# Patient Record
Sex: FEMALE | Race: OTHER | Employment: OTHER | URBAN - METROPOLITAN AREA
[De-identification: names, ages, dates, MRNs, and addresses within clinical notes are randomized per-mention and may not be internally consistent; named-entity substitution may affect disease eponyms.]

---

## 2017-01-09 ENCOUNTER — APPOINTMENT (OUTPATIENT)
Dept: CT IMAGING | Age: 72
End: 2017-01-09
Attending: EMERGENCY MEDICINE
Payer: MEDICARE

## 2017-01-09 ENCOUNTER — HOSPITAL ENCOUNTER (EMERGENCY)
Age: 72
Discharge: HOME | End: 2017-01-09
Attending: EMERGENCY MEDICINE
Payer: MEDICARE

## 2017-01-09 VITALS
HEIGHT: 64 IN | SYSTOLIC BLOOD PRESSURE: 131 MMHG | BODY MASS INDEX: 24.41 KG/M2 | WEIGHT: 143 LBS | RESPIRATION RATE: 18 BRPM | HEART RATE: 59 BPM | DIASTOLIC BLOOD PRESSURE: 71 MMHG | TEMPERATURE: 98 F | OXYGEN SATURATION: 98 %

## 2017-01-09 DIAGNOSIS — N20.0 KIDNEY STONE: Primary | ICD-10-CM

## 2017-01-09 DIAGNOSIS — K59.00 CONSTIPATION, UNSPECIFIED CONSTIPATION TYPE: ICD-10-CM

## 2017-01-09 LAB
ALBUMIN SERPL-MCNC: 3.6 G/DL (ref 3.5–4.8)
ALP LIVER SERPL-CCNC: 63 U/L (ref 55–142)
ALT SERPL-CCNC: 27 U/L (ref 14–54)
AST SERPL-CCNC: 24 U/L (ref 15–41)
BASOPHILS # BLD AUTO: 0.04 X10(3) UL (ref 0–0.1)
BASOPHILS NFR BLD AUTO: 0.5 %
BILIRUB SERPL-MCNC: 0.3 MG/DL (ref 0.1–2)
BILIRUB UR QL STRIP.AUTO: NEGATIVE
BUN BLD-MCNC: 19 MG/DL (ref 8–20)
CALCIUM BLD-MCNC: 8.4 MG/DL (ref 8.3–10.3)
CHLORIDE: 104 MMOL/L (ref 101–111)
CLARITY UR REFRACT.AUTO: CLEAR
CO2: 28 MMOL/L (ref 22–32)
COLOR UR AUTO: YELLOW
CREAT BLD-MCNC: 0.85 MG/DL (ref 0.55–1.02)
EOSINOPHIL # BLD AUTO: 0.19 X10(3) UL (ref 0–0.3)
EOSINOPHIL NFR BLD AUTO: 2.5 %
ERYTHROCYTE [DISTWIDTH] IN BLOOD BY AUTOMATED COUNT: 13.5 % (ref 11.5–16)
GLUCOSE BLD-MCNC: 116 MG/DL (ref 70–99)
GLUCOSE UR STRIP.AUTO-MCNC: NEGATIVE MG/DL
HCT VFR BLD AUTO: 39.3 % (ref 34–50)
HGB BLD-MCNC: 12.6 G/DL (ref 12–16)
IMMATURE GRANULOCYTE COUNT: 0.02 X10(3) UL (ref 0–1)
IMMATURE GRANULOCYTE RATIO %: 0.3 %
KETONES UR STRIP.AUTO-MCNC: NEGATIVE MG/DL
LEUKOCYTE ESTERASE UR QL STRIP.AUTO: NEGATIVE
LIPASE: 217 U/L (ref 73–393)
LYMPHOCYTES # BLD AUTO: 2.14 X10(3) UL (ref 0.9–4)
LYMPHOCYTES NFR BLD AUTO: 28.1 %
M PROTEIN MFR SERPL ELPH: 6.9 G/DL (ref 6.1–8.3)
MCH RBC QN AUTO: 29.5 PG (ref 27–33.2)
MCHC RBC AUTO-ENTMCNC: 32.1 G/DL (ref 31–37)
MCV RBC AUTO: 92 FL (ref 81–100)
MONOCYTES # BLD AUTO: 0.63 X10(3) UL (ref 0.1–0.6)
MONOCYTES NFR BLD AUTO: 8.3 %
NEUTROPHIL ABS PRELIM: 4.59 X10 (3) UL (ref 1.3–6.7)
NEUTROPHILS # BLD AUTO: 4.59 X10(3) UL (ref 1.3–6.7)
NEUTROPHILS NFR BLD AUTO: 60.3 %
NITRITE UR QL STRIP.AUTO: NEGATIVE
PH UR STRIP.AUTO: 6 [PH] (ref 4.5–8)
PLATELET # BLD AUTO: 227 10(3)UL (ref 150–450)
POTASSIUM SERPL-SCNC: 3.8 MMOL/L (ref 3.6–5.1)
PROT UR STRIP.AUTO-MCNC: NEGATIVE MG/DL
RBC # BLD AUTO: 4.27 X10(6)UL (ref 3.8–5.1)
RED CELL DISTRIBUTION WIDTH-SD: 45.6 FL (ref 35.1–46.3)
SODIUM SERPL-SCNC: 139 MMOL/L (ref 136–144)
SP GR UR STRIP.AUTO: 1.01 (ref 1–1.03)
UROBILINOGEN UR STRIP.AUTO-MCNC: 0.2 MG/DL
WBC # BLD AUTO: 7.6 X10(3) UL (ref 4–13)

## 2017-01-09 PROCEDURE — 85025 COMPLETE CBC W/AUTO DIFF WBC: CPT | Performed by: EMERGENCY MEDICINE

## 2017-01-09 PROCEDURE — 83690 ASSAY OF LIPASE: CPT | Performed by: EMERGENCY MEDICINE

## 2017-01-09 PROCEDURE — 96374 THER/PROPH/DIAG INJ IV PUSH: CPT

## 2017-01-09 PROCEDURE — 99284 EMERGENCY DEPT VISIT MOD MDM: CPT

## 2017-01-09 PROCEDURE — 80053 COMPREHEN METABOLIC PANEL: CPT | Performed by: EMERGENCY MEDICINE

## 2017-01-09 PROCEDURE — 81001 URINALYSIS AUTO W/SCOPE: CPT | Performed by: EMERGENCY MEDICINE

## 2017-01-09 PROCEDURE — 74176 CT ABD & PELVIS W/O CONTRAST: CPT

## 2017-01-09 RX ORDER — NAPROXEN 500 MG/1
500 TABLET ORAL 2 TIMES DAILY PRN
Qty: 20 TABLET | Refills: 0 | Status: SHIPPED | OUTPATIENT
Start: 2017-01-09 | End: 2017-04-12 | Stop reason: ALTCHOICE

## 2017-01-09 RX ORDER — KETOROLAC TROMETHAMINE 30 MG/ML
15 INJECTION, SOLUTION INTRAMUSCULAR; INTRAVENOUS ONCE
Status: COMPLETED | OUTPATIENT
Start: 2017-01-09 | End: 2017-01-09

## 2017-01-09 RX ORDER — IBUPROFEN 600 MG/1
600 TABLET ORAL ONCE
Status: DISCONTINUED | OUTPATIENT
Start: 2017-01-09 | End: 2017-01-09

## 2017-01-09 RX ORDER — DOCUSATE SODIUM 100 MG/1
100 CAPSULE, LIQUID FILLED ORAL 2 TIMES DAILY PRN
Qty: 20 CAPSULE | Refills: 0 | Status: SHIPPED | OUTPATIENT
Start: 2017-01-09 | End: 2017-05-22 | Stop reason: ALTCHOICE

## 2017-01-09 RX ORDER — TRAMADOL HYDROCHLORIDE 50 MG/1
TABLET ORAL EVERY 4 HOURS PRN
Qty: 20 TABLET | Refills: 0 | Status: SHIPPED | OUTPATIENT
Start: 2017-01-09 | End: 2017-01-16

## 2017-01-10 ENCOUNTER — TELEPHONE (OUTPATIENT)
Dept: FAMILY MEDICINE CLINIC | Facility: CLINIC | Age: 72
End: 2017-01-10

## 2017-01-10 NOTE — ED INITIAL ASSESSMENT (HPI)
Pt to ed co right flank pain onset around noon today describes pain as a pulling pain denies nausea denies urinary sxs

## 2017-01-10 NOTE — ED PROVIDER NOTES
Patient Seen in: THE [facility] Emergency Department In [place]    History   Patient presents with:  Abdomen/Flank Pain (GI/)    Stated Complaint: RIGHT FLANK PAIN    HPI    75-year-old female presents to the emergency department with complaints of right fl are negative. Positive for stated complaint: RIGHT FLANK PAIN  Other systems are as noted in HPI. Constitutional and vital signs reviewed. All other systems reviewed and negative except as noted above.     PSFH elements reviewed from today and ag COMP METABOLIC PANEL (14) - Abnormal; Notable for the following:     Glucose 116 (*)     All other components within normal limits   URINALYSIS WITH CULTURE REFLEX - Abnormal; Notable for the following:     Blood Urine Large (*)     All other components [address]  [phone]    Schedule an appointment as soon as possible for a visit      Rafael Hernandez MD  [facility] [address] [place] [address]  [phone]    Schedule an appointment as soon as possible for a visit        Medications Prescribed:  Anamika Arreguin

## 2017-01-10 NOTE — TELEPHONE ENCOUNTER
Patient was seen in the ER on 1/9/2017 for right flank pain. Pt thinks she at to much meat the night before and that is why she was having the pain. Patient states she was told in the ER all she has is inflammation.  Patient states her  was going to

## 2017-01-14 ENCOUNTER — OFFICE VISIT (OUTPATIENT)
Dept: FAMILY MEDICINE CLINIC | Facility: CLINIC | Age: 72
End: 2017-01-14

## 2017-01-14 VITALS
BODY MASS INDEX: 24.64 KG/M2 | TEMPERATURE: 98 F | SYSTOLIC BLOOD PRESSURE: 126 MMHG | HEART RATE: 57 BPM | HEIGHT: 63.25 IN | RESPIRATION RATE: 16 BRPM | WEIGHT: 140.81 LBS | OXYGEN SATURATION: 97 % | DIASTOLIC BLOOD PRESSURE: 64 MMHG

## 2017-01-14 DIAGNOSIS — R31.9 HEMATURIA: ICD-10-CM

## 2017-01-14 DIAGNOSIS — N20.0 RIGHT KIDNEY STONE: Primary | ICD-10-CM

## 2017-01-14 PROCEDURE — 99213 OFFICE O/P EST LOW 20 MIN: CPT | Performed by: FAMILY MEDICINE

## 2017-01-14 NOTE — PROGRESS NOTES
Grace Hayes is a 66year old female. S:  Patient presents today with the following concerns:  · Right sided abdominal pain continues. Noticing polyuria and blood in urine. Had CT scan in ER 1/9/17 and diagnosed with right kidney stone.   H on exertion  GI: see above  No N/V/D/C  : denies dysuria  MUSCULOSKELETAL: denies back pain  NEURO: denies headaches    EXAM:  /64 mmHg  Pulse 57  Temp(Src) 98 °F (36.7 °C) (Oral)  Resp 16  Ht 63.25\"  Wt 140 lb 12.8 oz  BMI 24.73 kg/m2  SpO2 97%

## 2017-02-10 ENCOUNTER — OFFICE VISIT (OUTPATIENT)
Dept: FAMILY MEDICINE CLINIC | Facility: CLINIC | Age: 72
End: 2017-02-10

## 2017-02-10 VITALS
BODY MASS INDEX: 25.34 KG/M2 | DIASTOLIC BLOOD PRESSURE: 70 MMHG | RESPIRATION RATE: 16 BRPM | WEIGHT: 143 LBS | TEMPERATURE: 98 F | HEIGHT: 63 IN | HEART RATE: 72 BPM | SYSTOLIC BLOOD PRESSURE: 136 MMHG

## 2017-02-10 DIAGNOSIS — R31.9 BLOOD IN URINE: Primary | ICD-10-CM

## 2017-02-10 LAB
GLUCOSE (URINE DIPSTICK): NEGATIVE MG/DL
LEUKOCYTES: NEGATIVE
MULTISTIX LOT#: ABNORMAL NUMERIC
NITRITE, URINE: NEGATIVE
PH, URINE: 6 (ref 4.5–8)
SPECIFIC GRAVITY: 1.02 (ref 1–1.03)
URINE-COLOR: YELLOW

## 2017-02-10 PROCEDURE — 81003 URINALYSIS AUTO W/O SCOPE: CPT | Performed by: FAMILY MEDICINE

## 2017-02-10 PROCEDURE — 99213 OFFICE O/P EST LOW 20 MIN: CPT | Performed by: FAMILY MEDICINE

## 2017-02-10 NOTE — PROGRESS NOTES
Brendan Hernández is a 66year old female. HPI:   Patient presents for follow-up on hematuria. Patient was seen in the emergency room 1/9/2017 and diagnosed with a right-sided kidney stone. She then saw Dr. Krissy King, 1/20/2017.   No stone was seen o

## 2017-02-24 ENCOUNTER — TELEPHONE (OUTPATIENT)
Dept: FAMILY MEDICINE CLINIC | Facility: CLINIC | Age: 72
End: 2017-02-24

## 2017-02-24 NOTE — TELEPHONE ENCOUNTER
Received incoming fax from [address] [place] [address] Patient is scheduled for Cystoscopy procedure on 03- with Dr. Krissy King and needs History and Physical Fax to [phone] BMP and EKG orders in the computer.   Appointment scheduled with Dr. Emmy Zaragoza for 02/27

## 2017-02-27 ENCOUNTER — OFFICE VISIT (OUTPATIENT)
Dept: FAMILY MEDICINE CLINIC | Facility: CLINIC | Age: 72
End: 2017-02-27

## 2017-02-27 VITALS
RESPIRATION RATE: 16 BRPM | HEIGHT: 62.8 IN | TEMPERATURE: 98 F | HEART RATE: 72 BPM | BODY MASS INDEX: 25.16 KG/M2 | DIASTOLIC BLOOD PRESSURE: 60 MMHG | WEIGHT: 142 LBS | SYSTOLIC BLOOD PRESSURE: 98 MMHG

## 2017-02-27 DIAGNOSIS — Z01.818 PREOP EXAMINATION: Primary | ICD-10-CM

## 2017-02-27 DIAGNOSIS — N20.1 URETERAL STONE: ICD-10-CM

## 2017-02-27 DIAGNOSIS — R31.0 GROSS HEMATURIA: ICD-10-CM

## 2017-02-27 DIAGNOSIS — I34.1 MVP (MITRAL VALVE PROLAPSE): ICD-10-CM

## 2017-02-27 DIAGNOSIS — F41.9 ANXIETY: ICD-10-CM

## 2017-02-27 PROCEDURE — 99214 OFFICE O/P EST MOD 30 MIN: CPT | Performed by: FAMILY MEDICINE

## 2017-02-27 NOTE — PROGRESS NOTES
Daya Wu is a 66year old female who presents for a pre-operative physical exam. Patient is to have cystoscopy, to be done by Dr. Teena Shanks on 3/6/17. Pt has had previous anesthesia:  Yes.   Previous complications:  No  Hx of DVT/PE:  no    HPI developed, well nourished,in no apparent distress  HEENT: atraumatic, normocephalic, O/P clear  NECK: supple,no adenopathy  LUNGS: clear to auscultation  CARDIO: RRR without murmur  GI: good BS's,no masses, HSM or tenderness  EXTREMITIES: no edema  NEURO:

## 2017-03-02 ENCOUNTER — APPOINTMENT (OUTPATIENT)
Dept: LAB | Age: 72
End: 2017-03-02
Attending: FAMILY MEDICINE
Payer: MEDICARE

## 2017-03-02 ENCOUNTER — APPOINTMENT (OUTPATIENT)
Dept: LAB | Age: 72
End: 2017-03-02
Attending: UROLOGY
Payer: MEDICARE

## 2017-03-02 ENCOUNTER — PRIOR ORIGINAL RECORDS (OUTPATIENT)
Dept: OTHER | Age: 72
End: 2017-03-02

## 2017-03-02 DIAGNOSIS — Z01.818 ENCOUNTER FOR PRE-OPERATIVE EXAMINATION: ICD-10-CM

## 2017-03-02 DIAGNOSIS — Z01.818 PREOP EXAMINATION: ICD-10-CM

## 2017-03-02 DIAGNOSIS — N20.1 RIGHT URETERAL STONE: ICD-10-CM

## 2017-03-02 DIAGNOSIS — R31.0 GROSS HEMATURIA: ICD-10-CM

## 2017-03-02 LAB
ATRIAL RATE: 61 BPM
BUN BLD-MCNC: 23 MG/DL (ref 8–20)
CALCIUM BLD-MCNC: 9.3 MG/DL (ref 8.3–10.3)
CHLORIDE: 104 MMOL/L (ref 101–111)
CO2: 29 MMOL/L (ref 22–32)
CREAT BLD-MCNC: 0.87 MG/DL (ref 0.55–1.02)
GLUCOSE BLD-MCNC: 96 MG/DL (ref 70–99)
P AXIS: 62 DEGREES
P-R INTERVAL: 232 MS
POTASSIUM SERPL-SCNC: 3.9 MMOL/L (ref 3.6–5.1)
Q-T INTERVAL: 422 MS
QRS DURATION: 88 MS
QTC CALCULATION (BEZET): 424 MS
R AXIS: 13 DEGREES
SODIUM SERPL-SCNC: 140 MMOL/L (ref 136–144)
T AXIS: 48 DEGREES
VENTRICULAR RATE: 61 BPM

## 2017-03-02 PROCEDURE — 36415 COLL VENOUS BLD VENIPUNCTURE: CPT

## 2017-03-02 PROCEDURE — 80048 BASIC METABOLIC PNL TOTAL CA: CPT

## 2017-03-02 PROCEDURE — 93010 ELECTROCARDIOGRAM REPORT: CPT | Performed by: INTERNAL MEDICINE

## 2017-03-02 PROCEDURE — 93005 ELECTROCARDIOGRAM TRACING: CPT

## 2017-03-06 ENCOUNTER — APPOINTMENT (OUTPATIENT)
Dept: GENERAL RADIOLOGY | Age: 72
End: 2017-03-06
Attending: EMERGENCY MEDICINE
Payer: MEDICARE

## 2017-03-06 ENCOUNTER — PRIOR ORIGINAL RECORDS (OUTPATIENT)
Dept: OTHER | Age: 72
End: 2017-03-06

## 2017-03-06 ENCOUNTER — HOSPITAL ENCOUNTER (EMERGENCY)
Age: 72
Discharge: HOME | End: 2017-03-06
Attending: EMERGENCY MEDICINE
Payer: MEDICARE

## 2017-03-06 ENCOUNTER — OFFICE VISIT (OUTPATIENT)
Dept: FAMILY MEDICINE CLINIC | Facility: CLINIC | Age: 72
End: 2017-03-06

## 2017-03-06 VITALS
HEART RATE: 62 BPM | RESPIRATION RATE: 16 BRPM | BODY MASS INDEX: 22.82 KG/M2 | TEMPERATURE: 99 F | DIASTOLIC BLOOD PRESSURE: 60 MMHG | OXYGEN SATURATION: 99 % | SYSTOLIC BLOOD PRESSURE: 140 MMHG | HEIGHT: 66 IN | WEIGHT: 142 LBS

## 2017-03-06 VITALS
HEART RATE: 60 BPM | SYSTOLIC BLOOD PRESSURE: 96 MMHG | TEMPERATURE: 98 F | BODY MASS INDEX: 25.27 KG/M2 | WEIGHT: 142.63 LBS | RESPIRATION RATE: 16 BRPM | DIASTOLIC BLOOD PRESSURE: 40 MMHG | HEIGHT: 63 IN

## 2017-03-06 DIAGNOSIS — I95.9 HYPOTENSION, UNSPECIFIED HYPOTENSION TYPE: ICD-10-CM

## 2017-03-06 DIAGNOSIS — I49.3 PVC'S (PREMATURE VENTRICULAR CONTRACTIONS): ICD-10-CM

## 2017-03-06 DIAGNOSIS — R00.1 BRADYCARDIA: Primary | ICD-10-CM

## 2017-03-06 LAB
ALBUMIN SERPL-MCNC: 4.1 G/DL (ref 3.5–4.8)
ALP LIVER SERPL-CCNC: 58 U/L (ref 55–142)
ALT SERPL-CCNC: 23 U/L (ref 14–54)
AST SERPL-CCNC: 26 U/L (ref 15–41)
ATRIAL RATE: 67 BPM
BASOPHILS # BLD AUTO: 0.04 X10(3) UL (ref 0–0.1)
BASOPHILS NFR BLD AUTO: 0.6 %
BILIRUB SERPL-MCNC: 0.6 MG/DL (ref 0.1–2)
BUN BLD-MCNC: 17 MG/DL (ref 8–20)
CALCIUM BLD-MCNC: 9.1 MG/DL (ref 8.3–10.3)
CHLORIDE: 106 MMOL/L (ref 101–111)
CO2: 31 MMOL/L (ref 22–32)
CREAT BLD-MCNC: 0.84 MG/DL (ref 0.55–1.02)
EOSINOPHIL # BLD AUTO: 0.11 X10(3) UL (ref 0–0.3)
EOSINOPHIL NFR BLD AUTO: 1.7 %
ERYTHROCYTE [DISTWIDTH] IN BLOOD BY AUTOMATED COUNT: 13.4 % (ref 11.5–16)
GLUCOSE BLD-MCNC: 100 MG/DL (ref 70–99)
HCT VFR BLD AUTO: 43.1 % (ref 34–50)
HGB BLD-MCNC: 13.8 G/DL (ref 12–16)
IMMATURE GRANULOCYTE COUNT: 0.01 X10(3) UL (ref 0–1)
IMMATURE GRANULOCYTE RATIO %: 0.2 %
LYMPHOCYTES # BLD AUTO: 1.83 X10(3) UL (ref 0.9–4)
LYMPHOCYTES NFR BLD AUTO: 28.2 %
M PROTEIN MFR SERPL ELPH: 7.4 G/DL (ref 6.1–8.3)
MCH RBC QN AUTO: 29.9 PG (ref 27–33.2)
MCHC RBC AUTO-ENTMCNC: 32 G/DL (ref 31–37)
MCV RBC AUTO: 93.5 FL (ref 81–100)
MONOCYTES # BLD AUTO: 0.56 X10(3) UL (ref 0.1–0.6)
MONOCYTES NFR BLD AUTO: 8.6 %
NEUTROPHIL ABS PRELIM: 3.93 X10 (3) UL (ref 1.3–6.7)
NEUTROPHILS # BLD AUTO: 3.93 X10(3) UL (ref 1.3–6.7)
NEUTROPHILS NFR BLD AUTO: 60.7 %
P AXIS: 42 DEGREES
P-R INTERVAL: 214 MS
PLATELET # BLD AUTO: 232 10(3)UL (ref 150–450)
POTASSIUM SERPL-SCNC: 4 MMOL/L (ref 3.6–5.1)
Q-T INTERVAL: 438 MS
QRS DURATION: 84 MS
QTC CALCULATION (BEZET): 462 MS
R AXIS: -10 DEGREES
RBC # BLD AUTO: 4.61 X10(6)UL (ref 3.8–5.1)
RED CELL DISTRIBUTION WIDTH-SD: 46 FL (ref 35.1–46.3)
SODIUM SERPL-SCNC: 141 MMOL/L (ref 136–144)
T AXIS: 1 DEGREES
TROPONIN: <0.046 NG/ML (ref ?–0.05)
VENTRICULAR RATE: 67 BPM
WBC # BLD AUTO: 6.5 X10(3) UL (ref 4–13)

## 2017-03-06 PROCEDURE — 99285 EMERGENCY DEPT VISIT HI MDM: CPT

## 2017-03-06 PROCEDURE — 71010 XR CHEST AP PORTABLE  (CPT=71010): CPT

## 2017-03-06 PROCEDURE — 85025 COMPLETE CBC W/AUTO DIFF WBC: CPT | Performed by: EMERGENCY MEDICINE

## 2017-03-06 PROCEDURE — 93010 ELECTROCARDIOGRAM REPORT: CPT

## 2017-03-06 PROCEDURE — 99213 OFFICE O/P EST LOW 20 MIN: CPT | Performed by: FAMILY MEDICINE

## 2017-03-06 PROCEDURE — 84484 ASSAY OF TROPONIN QUANT: CPT | Performed by: EMERGENCY MEDICINE

## 2017-03-06 PROCEDURE — 80053 COMPREHEN METABOLIC PANEL: CPT | Performed by: EMERGENCY MEDICINE

## 2017-03-06 PROCEDURE — 96374 THER/PROPH/DIAG INJ IV PUSH: CPT

## 2017-03-06 PROCEDURE — 93005 ELECTROCARDIOGRAM TRACING: CPT

## 2017-03-06 PROCEDURE — 99284 EMERGENCY DEPT VISIT MOD MDM: CPT

## 2017-03-06 NOTE — ED PROVIDER NOTES
Patient Seen in: [facility] Emergency Department In [place]    History   Patient presents with:  Hypotension (cardiovascular)    Stated Complaint: WAS [address].     HPI    49-year-old female presents emergency department negative except as noted above. PSFH elements reviewed from today and agreed except as otherwise stated in HPI.     Physical Exam     ED Triage Vitals   BP 03/06/17 1125 142/66 mmHg   Pulse 03/06/17 1125 66   Resp 03/06/17 1125 18   Temp 03/06/17 1125 98 EKG    Rate, intervals and axes as noted on EKG Report.   Rate: 60  Rhythm: Sinus Rhythm  Reading: Frequent PVCs first-degree block          I told the patient if she is feeling symptomatic she should probably cut back her Coreg but I would like her to chec

## 2017-03-06 NOTE — PROGRESS NOTES
Chief Complaint:  Patient presents with:  Blood Pressure: Was to have kidney stone on R side removed. Went to have surgery and her BP was very low. She was sent to [facility] and she was told she was fine. They sent her to her PCP.       HPI:  This is a 66 ye Take 1 tablet (500 mg total) by mouth 2 (two) times daily as needed. Disp: 20 tablet Rfl: 0   Omega-3-acid Ethyl Esters 1 G Oral Cap Take 1 g by mouth daily. Disp:  Rfl:    vitamin E 400 UNITS Oral Cap Take 1,000 Units by mouth.  Disp:  Rfl:    Sertraline H and nutrients (protein, fruits and water). Will recheck in 2 weeks. IF improved, will plan to clear for procedure.       Johanne Fletcher DO  3/6/2017 2:25 PM  Family Medicine

## 2017-03-06 NOTE — ED INITIAL ASSESSMENT (HPI)
Was at [facility], for kidney stone removal, per MD at center, pt was bradycardic. Pt denies any symptoms.

## 2017-03-20 ENCOUNTER — OFFICE VISIT (OUTPATIENT)
Dept: FAMILY MEDICINE CLINIC | Facility: CLINIC | Age: 72
End: 2017-03-20

## 2017-03-20 VITALS
WEIGHT: 143 LBS | DIASTOLIC BLOOD PRESSURE: 70 MMHG | RESPIRATION RATE: 16 BRPM | TEMPERATURE: 98 F | SYSTOLIC BLOOD PRESSURE: 120 MMHG | BODY MASS INDEX: 25.34 KG/M2 | HEART RATE: 72 BPM | HEIGHT: 63 IN

## 2017-03-20 DIAGNOSIS — E78.5 HYPERLIPIDEMIA, UNSPECIFIED HYPERLIPIDEMIA TYPE: ICD-10-CM

## 2017-03-20 DIAGNOSIS — Z01.818 PREOP EXAMINATION: Primary | ICD-10-CM

## 2017-03-20 DIAGNOSIS — I34.1 MVP (MITRAL VALVE PROLAPSE): ICD-10-CM

## 2017-03-20 DIAGNOSIS — I49.3 PVC (PREMATURE VENTRICULAR CONTRACTION): ICD-10-CM

## 2017-03-20 DIAGNOSIS — F41.9 ANXIETY: ICD-10-CM

## 2017-03-20 DIAGNOSIS — N20.1 URETERAL STONE: ICD-10-CM

## 2017-03-20 PROBLEM — E78.00 PURE HYPERCHOLESTEROLEMIA: Status: ACTIVE | Noted: 2017-03-20

## 2017-03-20 PROCEDURE — 99214 OFFICE O/P EST MOD 30 MIN: CPT | Performed by: FAMILY MEDICINE

## 2017-03-20 NOTE — PROGRESS NOTES
Sindy Wolfe is a 66year old female who presents for a pre-operative physical exam. Patient is to have cystoscopy, to be done by Dr. Angelita Rivera.  Pt has had previous anesthesia:  Yes.   Previous complications:  No  Hx of DVT/PE:  no    HPI:   Patient carvedilol 3.125 MG Oral Tab 3.125 mg.    Disp:  Rfl:       Allergies: No Known Allergies   Past Medical History   Diagnosis Date   • Other and unspecified hyperlipidemia    • Arthritis    • Osteoporosis           Past Surgical History    TOTAL ABDOM HYST

## 2017-04-12 ENCOUNTER — OFFICE VISIT (OUTPATIENT)
Dept: FAMILY MEDICINE CLINIC | Facility: CLINIC | Age: 72
End: 2017-04-12

## 2017-04-12 VITALS
DIASTOLIC BLOOD PRESSURE: 70 MMHG | TEMPERATURE: 100 F | OXYGEN SATURATION: 96 % | RESPIRATION RATE: 12 BRPM | SYSTOLIC BLOOD PRESSURE: 128 MMHG | HEART RATE: 73 BPM

## 2017-04-12 DIAGNOSIS — R68.89 FLU-LIKE SYMPTOMS: Primary | ICD-10-CM

## 2017-04-12 DIAGNOSIS — R50.9 FEVER, UNSPECIFIED FEVER CAUSE: ICD-10-CM

## 2017-04-12 DIAGNOSIS — R52 BODY ACHES: ICD-10-CM

## 2017-04-12 DIAGNOSIS — R05.9 COUGH: ICD-10-CM

## 2017-04-12 PROCEDURE — 99213 OFFICE O/P EST LOW 20 MIN: CPT | Performed by: PHYSICIAN ASSISTANT

## 2017-04-12 RX ORDER — AZITHROMYCIN 250 MG/1
TABLET, FILM COATED ORAL
Qty: 6 TABLET | Refills: 0 | Status: SHIPPED | OUTPATIENT
Start: 2017-04-12 | End: 2017-04-21 | Stop reason: ALTCHOICE

## 2017-04-12 NOTE — PROGRESS NOTES
CC:  Patient presents with:  Cough: Dry cough, fever, body aches, some congestion. HPI: Jeanine Russ presents with complaints of body aches, a low grade fever, and a mild cough. Symptoms have been present for 1 day. She has taken Dayquil this morning.  Her Sinus: No TTP over frontal/ethmoid sinuses; no TTP over maxillary sinuses; no facial swelling   Neck: Normal ROM; no deformities or tenderness to palpation; no thyromegaly   Lymph:  No cervical/supraclavicular adenopathy   Cardiovascular: RRR; no murmurs azithromycin (ZITHROMAX Z-HAN) 250 MG Oral Tab 6 tablet 0      Sig: Take two tablets by mouth today, then one tablet daily.

## 2017-04-17 ENCOUNTER — HOSPITAL (OUTPATIENT)
Dept: OTHER | Age: 72
End: 2017-04-17
Attending: UROLOGY

## 2017-04-21 ENCOUNTER — OFFICE VISIT (OUTPATIENT)
Dept: FAMILY MEDICINE CLINIC | Facility: CLINIC | Age: 72
End: 2017-04-21

## 2017-04-21 VITALS
DIASTOLIC BLOOD PRESSURE: 60 MMHG | WEIGHT: 139 LBS | RESPIRATION RATE: 16 BRPM | HEART RATE: 72 BPM | TEMPERATURE: 98 F | SYSTOLIC BLOOD PRESSURE: 120 MMHG | BODY MASS INDEX: 25 KG/M2

## 2017-04-21 DIAGNOSIS — J01.40 ACUTE NON-RECURRENT PANSINUSITIS: Primary | ICD-10-CM

## 2017-04-21 PROCEDURE — 99213 OFFICE O/P EST LOW 20 MIN: CPT | Performed by: FAMILY MEDICINE

## 2017-04-21 RX ORDER — AMOXICILLIN 875 MG/1
875 TABLET, COATED ORAL 2 TIMES DAILY
Qty: 20 TABLET | Refills: 0 | Status: SHIPPED | OUTPATIENT
Start: 2017-04-21 | End: 2017-05-22 | Stop reason: ALTCHOICE

## 2017-04-21 NOTE — PROGRESS NOTES
HPI:   Grace Hayes is a 66year old female who presents for upper respiratory symptoms. Symptoms include:  Productive cough. Sx for 2 weeks. Also with sore throat, sinus pressure and PND. Pt seen here 9 days ago and given zpak.   2 days ago Prescriptions Disp Refills    amoxicillin 875 MG Oral Tab 20 tablet 0      Sig: Take 1 tablet (875 mg total) by mouth 2 (two) times daily.          Imaging & Consults:  None

## 2017-04-27 ENCOUNTER — OFFICE VISIT (OUTPATIENT)
Dept: FAMILY MEDICINE CLINIC | Facility: CLINIC | Age: 72
End: 2017-04-27

## 2017-04-27 VITALS
SYSTOLIC BLOOD PRESSURE: 110 MMHG | BODY MASS INDEX: 25.12 KG/M2 | HEART RATE: 72 BPM | HEIGHT: 62.5 IN | WEIGHT: 140 LBS | DIASTOLIC BLOOD PRESSURE: 60 MMHG | TEMPERATURE: 98 F | RESPIRATION RATE: 16 BRPM

## 2017-04-27 DIAGNOSIS — R10.11 RUQ PAIN: Primary | ICD-10-CM

## 2017-04-27 PROCEDURE — 99213 OFFICE O/P EST LOW 20 MIN: CPT | Performed by: FAMILY MEDICINE

## 2017-04-29 NOTE — PROGRESS NOTES
Jeimy Antonio is a 66year old female. HPI:   Pt has had RUQ pain since 10/2016. US done at that time, no gallstones. She eventually had CT showing stone in right ureter. This was thought to be cause of her sx.   She has seen urology, had

## 2017-05-09 ENCOUNTER — TELEPHONE (OUTPATIENT)
Dept: FAMILY MEDICINE CLINIC | Facility: CLINIC | Age: 72
End: 2017-05-09

## 2017-05-09 NOTE — TELEPHONE ENCOUNTER
LM asking patient to contact our office to schedule MA super visit with Dr. Stephenie Fletcher or anyone of our providers.

## 2017-05-15 ENCOUNTER — HOSPITAL ENCOUNTER (OUTPATIENT)
Dept: NUCLEAR MEDICINE | Facility: HOSPITAL | Age: 72
Discharge: HOME | End: 2017-05-15
Attending: FAMILY MEDICINE
Payer: MEDICARE

## 2017-05-15 DIAGNOSIS — R10.11 RUQ PAIN: ICD-10-CM

## 2017-05-15 PROCEDURE — 78226 HEPATOBILIARY SYSTEM IMAGING: CPT | Performed by: FAMILY MEDICINE

## 2017-05-15 PROCEDURE — 78227 HEPATOBIL SYST IMAGE W/DRUG: CPT | Performed by: FAMILY MEDICINE

## 2017-05-22 PROCEDURE — 81015 MICROSCOPIC EXAM OF URINE: CPT | Performed by: UROLOGY

## 2017-08-01 ENCOUNTER — TELEPHONE (OUTPATIENT)
Dept: FAMILY MEDICINE CLINIC | Facility: CLINIC | Age: 72
End: 2017-08-01

## 2017-08-01 NOTE — TELEPHONE ENCOUNTER
Called and talked to Lynda Real explained that [facility] prefers to do testing via [facility] that he needs to discuss this with [facility] and see what they want them to do.  Dr Dimas Downs is not an Anita Lewis MD so they are going to call [facility]

## 2017-08-01 NOTE — TELEPHONE ENCOUNTER
Dr Mary Kay Momin cardiologist is looking for a referral for US of the heart Echo chardiogram procedure code is [id number] diagnosis R00.2 - R06.02 - I51.7 - I35.1  They would like to have this fax [phone]

## 2017-08-02 ENCOUNTER — TELEPHONE (OUTPATIENT)
Dept: FAMILY MEDICINE CLINIC | Facility: CLINIC | Age: 72
End: 2017-08-02

## 2017-08-02 NOTE — TELEPHONE ENCOUNTER
Dr Get Dangelo is an out-of-network cardiologist and patient needs to be seen by an in-network provider  [facility] at patient's home number to call Clinical Supervisor back regarding this referral request

## 2017-08-02 NOTE — TELEPHONE ENCOUNTER
Referral   Received: Yesterday   Message Contents   Mague Costello CNA  P [facility] 03 Clinical Staff   Cc: Kevin Haider [place] Referral [place]   Phone Number: [phone]             .Reason for the order/referral:Referral   PCP:  Dr. Des Sims   Refer to Provide

## 2017-09-16 ENCOUNTER — HOSPITAL ENCOUNTER (OUTPATIENT)
Age: 72
Discharge: HOME | End: 2017-09-16
Attending: FAMILY MEDICINE
Payer: MEDICARE

## 2017-09-16 ENCOUNTER — APPOINTMENT (OUTPATIENT)
Dept: GENERAL RADIOLOGY | Age: 72
End: 2017-09-16
Attending: FAMILY MEDICINE
Payer: MEDICARE

## 2017-09-16 VITALS
OXYGEN SATURATION: 98 % | SYSTOLIC BLOOD PRESSURE: 178 MMHG | TEMPERATURE: 97 F | RESPIRATION RATE: 18 BRPM | DIASTOLIC BLOOD PRESSURE: 87 MMHG | HEART RATE: 54 BPM

## 2017-09-16 DIAGNOSIS — R07.89 CHEST PAIN, ATYPICAL: Primary | ICD-10-CM

## 2017-09-16 LAB
#LYMPHOCYTE IC: 1.5 X10ˆ3/UL (ref 0.9–3.2)
#MXD IC: 0.5 X10ˆ3/UL (ref 0.1–1)
#NEUTROPHIL IC: 3.6 X10ˆ3/UL (ref 1.3–6.7)
CREAT SERPL-MCNC: 0.8 MG/DL (ref 0.4–1)
GLUCOSE BLD-MCNC: 101 MG/DL (ref 65–99)
HCT IC: 45.5 % (ref 37–54)
HGB IC: 14.8 G/DL (ref 11.7–16)
ISTAT BLOOD GAS TCO2: 25 MMOL/L (ref 22–32)
ISTAT BUN: 20 MG/DL (ref 8–20)
ISTAT CHLORIDE: 105 MMOL/L (ref 101–111)
ISTAT HEMATOCRIT: 45 % (ref 37–54)
ISTAT IONIZED CALCIUM: 1.16 MMOL/L (ref 1.12–1.32)
ISTAT POTASSIUM: 3.7 MMOL/L (ref 3.6–5.1)
ISTAT SODIUM: 142 MMOL/L (ref 136–144)
ISTAT TROPONIN: <0.1 NG/ML (ref ?–0.1)
LYMPHOCYTES NFR BLD AUTO: 25.9 %
MCH IC: 30.1 PG (ref 27–33.2)
MCHC IC: 32.5 G/DL (ref 31–37)
MCV IC: 92.5 FL (ref 81–100)
MIXED CELL %: 9.7 %
NEUTROPHILS NFR BLD AUTO: 64.4 %
PLT IC: 219 X10ˆ3/UL (ref 150–450)
RBC IC: 4.92 X10ˆ6/UL (ref 3.8–5.1)
WBC IC: 5.6 X10ˆ3/UL (ref 4–13)

## 2017-09-16 PROCEDURE — 99215 OFFICE O/P EST HI 40 MIN: CPT

## 2017-09-16 PROCEDURE — 71020 XR CHEST PA + LAT CHEST (CPT=71020): CPT | Performed by: FAMILY MEDICINE

## 2017-09-16 PROCEDURE — 84484 ASSAY OF TROPONIN QUANT: CPT

## 2017-09-16 PROCEDURE — 93005 ELECTROCARDIOGRAM TRACING: CPT

## 2017-09-16 PROCEDURE — 80047 BASIC METABLC PNL IONIZED CA: CPT

## 2017-09-16 PROCEDURE — 93010 ELECTROCARDIOGRAM REPORT: CPT

## 2017-09-16 PROCEDURE — 36415 COLL VENOUS BLD VENIPUNCTURE: CPT

## 2017-09-16 PROCEDURE — 85025 COMPLETE CBC W/AUTO DIFF WBC: CPT | Performed by: FAMILY MEDICINE

## 2017-09-16 RX ORDER — ASPIRIN 81 MG/1
324 TABLET, CHEWABLE ORAL ONCE
Status: COMPLETED | OUTPATIENT
Start: 2017-09-16 | End: 2017-09-16

## 2017-09-16 NOTE — ED INITIAL ASSESSMENT (HPI)
Pt reports a pulling to her left upper chest.  Feeling started yesterday when she reached down to pick something up. Pain does not radiate. Denies fever, sob, nausea, vomiting. Not feeling this now.

## 2017-09-16 NOTE — ED PROVIDER NOTES
Patient Seen in: [facility]    History   No chief complaint on file.     Stated Complaint: PAIN LT SHLDR/CHEST AREA X 1 DAY    HPI    Patient with past medical history of anxiety and palpitations presents with left anterior chest DP/PT  Neuro: A&O x3  Skin: No rash, no erythema    ED Course     Labs Reviewed   POCT ISTAT CHEM8 CARTRIDGE - Abnormal; Notable for the following:        Result Value    ISTAT Glucose 101 (*)     All other components within normal limits   POCT CBC - Norm Cap  Take 1 capsule by mouth every 6 (six) hours as needed for Pain.   Qty: 30 capsule Refills: 0

## 2017-11-08 ENCOUNTER — OFFICE VISIT (OUTPATIENT)
Dept: FAMILY MEDICINE CLINIC | Facility: CLINIC | Age: 72
End: 2017-11-08

## 2017-11-08 VITALS
HEART RATE: 60 BPM | WEIGHT: 144.38 LBS | SYSTOLIC BLOOD PRESSURE: 140 MMHG | DIASTOLIC BLOOD PRESSURE: 70 MMHG | TEMPERATURE: 98 F | BODY MASS INDEX: 25.9 KG/M2 | HEIGHT: 62.6 IN | RESPIRATION RATE: 16 BRPM

## 2017-11-08 DIAGNOSIS — R42 DIZZINESS: Primary | ICD-10-CM

## 2017-11-08 DIAGNOSIS — I34.1 MVP (MITRAL VALVE PROLAPSE): ICD-10-CM

## 2017-11-08 PROCEDURE — 99214 OFFICE O/P EST MOD 30 MIN: CPT | Performed by: PHYSICIAN ASSISTANT

## 2017-11-08 PROCEDURE — 93000 ELECTROCARDIOGRAM COMPLETE: CPT | Performed by: PHYSICIAN ASSISTANT

## 2017-11-08 RX ORDER — MECLIZINE HYDROCHLORIDE 25 MG/1
25 TABLET ORAL 3 TIMES DAILY PRN
Qty: 21 TABLET | Refills: 0 | Status: SHIPPED | OUTPATIENT
Start: 2017-11-08 | End: 2018-07-24

## 2017-11-08 NOTE — PROGRESS NOTES
CC:  Patient presents with:  Dizziness: started yesterday evening- gets wrose when moving- had similar episode three years ago       HPI: Dudley Turcios presents with complaints of mild dizziness since last night.  She feels much better today than she did last nig Normal urination; no hematuria or urgency/frequency   Musculoskeletal: No pain/weakness in arms/legs; normal range of motion   Skin: No rashes or new skin lesions; no unusual moles   Neurological:  No weakness, no numbness; normal gait   Hematological:  No cardiology. She was advised to seek immediate emergency medical attention for red-flag signs. Return in 2-3 days if symptoms persist; sooner as needed.     Total face-time with Kylah: 25 minutes, with greater than 50% of that time spent discussing her C

## 2017-11-24 ENCOUNTER — PRIOR ORIGINAL RECORDS (OUTPATIENT)
Dept: OTHER | Age: 72
End: 2017-11-24

## 2017-11-27 ENCOUNTER — TELEPHONE (OUTPATIENT)
Dept: FAMILY MEDICINE CLINIC | Facility: CLINIC | Age: 72
End: 2017-11-27

## 2017-11-27 DIAGNOSIS — E78.00 PURE HYPERCHOLESTEROLEMIA: ICD-10-CM

## 2017-11-27 DIAGNOSIS — Z12.31 ENCOUNTER FOR SCREENING MAMMOGRAM FOR BREAST CANCER: Primary | ICD-10-CM

## 2017-11-27 NOTE — TELEPHONE ENCOUNTER
Patient needs orders for mammogram please place orders and contact spouse once completed. Spouse would also like to discuss orders for cholesterol check.

## 2017-11-27 NOTE — TELEPHONE ENCOUNTER
Pt coming in for MA Supervisit 12/18/17. Spouse was wondering if Lipid should be checked. Last Lipid Sept 2016. Would you like pt to have labs done ? Routed to Dr Ning Villa.

## 2017-11-30 ENCOUNTER — TELEPHONE (OUTPATIENT)
Dept: FAMILY MEDICINE CLINIC | Facility: CLINIC | Age: 72
End: 2017-11-30

## 2017-11-30 NOTE — TELEPHONE ENCOUNTER
Spoke with - he states Pt saw cardiologist and on AVS it states something about seeing vein clinic.  called # and they do not take there insurance.  I asked  why Pt is to see vein clinic and  does not know and does not remember d

## 2017-11-30 NOTE — TELEPHONE ENCOUNTER
Pt was referred out to a vein clinic and they are saying that the clinic doesn't accept the insurance and they need a referral to someone that does.

## 2017-12-01 ENCOUNTER — HOSPITAL ENCOUNTER (OUTPATIENT)
Dept: MAMMOGRAPHY | Age: 72
Discharge: HOME | End: 2017-12-01
Attending: FAMILY MEDICINE
Payer: MEDICARE

## 2017-12-01 DIAGNOSIS — Z12.31 ENCOUNTER FOR SCREENING MAMMOGRAM FOR BREAST CANCER: ICD-10-CM

## 2017-12-01 PROCEDURE — 77067 SCR MAMMO BI INCL CAD: CPT | Performed by: FAMILY MEDICINE

## 2017-12-12 ENCOUNTER — TELEPHONE (OUTPATIENT)
Dept: FAMILY MEDICINE CLINIC | Facility: CLINIC | Age: 72
End: 2017-12-12

## 2017-12-13 ENCOUNTER — MA CHART PREP (OUTPATIENT)
Dept: FAMILY MEDICINE CLINIC | Facility: CLINIC | Age: 72
End: 2017-12-13

## 2017-12-13 PROBLEM — K76.89 HEPATIC CYST: Status: ACTIVE | Noted: 2017-12-13

## 2017-12-13 PROBLEM — M51.34 DEGENERATIVE DISC DISEASE, THORACIC: Status: ACTIVE | Noted: 2017-12-13

## 2017-12-14 LAB
ALBUMIN: 4.1 G/DL
ALKALINE PHOSPHATATE(ALK PHOS): 58 IU/L
BILIRUBIN TOTAL: 0.6 MG/DL
BUN: 17 MG/DL
BUN: 23 MG/DL
CALCIUM: 9.1 MG/DL
CALCIUM: 9.3 MG/DL
CHLORIDE: 104 MEQ/L
CHLORIDE: 106 MEQ/L
CREATININE, SERUM: 0.84 MG/DL
CREATININE, SERUM: 0.87 MG/DL
GLUCOSE: 100 MG/DL
GLUCOSE: 96 MG/DL
HEMATOCRIT: 43.1 %
HEMOGLOBIN: 13.8 G/DL
PLATELETS: 232 K/UL
POTASSIUM, SERUM: 3.9 MEQ/L
POTASSIUM, SERUM: 4 MEQ/L
PROTEIN, TOTAL: 7.4 G/DL
RED BLOOD COUNT: 4.61 X 10-6/U
SGOT (AST): 26 IU/L
SGPT (ALT): 23 IU/L
SODIUM: 140 MEQ/L
SODIUM: 141 MEQ/L
WHITE BLOOD COUNT: 6.5 X 10-3/U

## 2017-12-18 ENCOUNTER — OFFICE VISIT (OUTPATIENT)
Dept: FAMILY MEDICINE CLINIC | Facility: CLINIC | Age: 72
End: 2017-12-18

## 2017-12-18 VITALS
SYSTOLIC BLOOD PRESSURE: 122 MMHG | HEART RATE: 88 BPM | RESPIRATION RATE: 10 BRPM | WEIGHT: 146 LBS | BODY MASS INDEX: 24.92 KG/M2 | DIASTOLIC BLOOD PRESSURE: 70 MMHG | HEIGHT: 64 IN

## 2017-12-18 DIAGNOSIS — K76.89 HEPATIC CYST: ICD-10-CM

## 2017-12-18 DIAGNOSIS — M51.34 DEGENERATIVE DISC DISEASE, THORACIC: ICD-10-CM

## 2017-12-18 DIAGNOSIS — E78.00 PURE HYPERCHOLESTEROLEMIA: ICD-10-CM

## 2017-12-18 DIAGNOSIS — I34.1 MVP (MITRAL VALVE PROLAPSE): ICD-10-CM

## 2017-12-18 DIAGNOSIS — R42 DIZZINESS: ICD-10-CM

## 2017-12-18 DIAGNOSIS — I49.3 PVC (PREMATURE VENTRICULAR CONTRACTION): ICD-10-CM

## 2017-12-18 DIAGNOSIS — Z00.00 ENCOUNTER FOR ANNUAL HEALTH EXAMINATION: Primary | ICD-10-CM

## 2017-12-18 PROCEDURE — 96160 PT-FOCUSED HLTH RISK ASSMT: CPT | Performed by: FAMILY MEDICINE

## 2017-12-18 PROCEDURE — G0009 ADMIN PNEUMOCOCCAL VACCINE: HCPCS | Performed by: FAMILY MEDICINE

## 2017-12-18 PROCEDURE — 90732 PPSV23 VACC 2 YRS+ SUBQ/IM: CPT | Performed by: FAMILY MEDICINE

## 2017-12-18 NOTE — PATIENT INSTRUCTIONS
Please call [phone] to schedule at [facility]. Kylah Zarco's SCREENING SCHEDULE   Tests on this list are recommended by your physician but may not be covered, or covered at this frequency, by your insurer.  Please check with your insuranc Screening  Covered up to Age 76     Colonoscopy Screen   Covered every 10 years- more often if abnormal Colonoscopy,10 Years due on 12/01/1993 Update Health Maintenance if applicable    Flex Sigmoidoscopy Screen  Covered every 5 years No results found for on 10/15/16  -PNEUMOCOCCAL VACC, 13 MILES IM    Please get once after your 65th birthday    Pneumococcal 23 (Pneumovax)  Covered Once after 65   Orders placed or performed in visit on 12/18/17  -PNEUMOCOCCAL IMM (PNEUMOVAX)    Please get once after your 65th

## 2017-12-18 NOTE — PROGRESS NOTES
HPI:   Cesario Ruffin is a 74year old female who presents for a MA (Medicare Advantage) [facility] (Once per calendar year). MVP:  Cardiology has instructed her to take carvedilol 3.125 prn palpitations. She does not take it regularly.   Se NOT have a Living Will on file in [address] [place] [address] Rd.    Advance care planning including the explanation and discussion of advance directives standard forms performed Face to Face with patient and Family/surrogate (if present), and forms available to patient in AVS nightly. MEDICAL INFORMATION:   She  has a past medical history of Arthritis; Hematuria; Osteoporosis; and Other and unspecified hyperlipidemia.     She  has a past surgical history that includes total abdom hysterectomy; cystoscopy,+ureteroscopy (Alexi Lawyer Mora is a 74year old female who presents for a Medicare Assessment. PLAN SUMMARY:    1. Encounter for annual health examination  Pneumovax given today    2.  Dizziness  US ordered, f/u with cards this month as scheduled  - [address] Density Screening      Dexascan Every two years Last Dexa Scan:   XR DEXA BONE DENSITOMETRY (CPT=77080) 09/17/2016    No flowsheet data found.     Pap and Pelvic      Pap: Every 3 yrs age 21-65 or Pap+HPV every 5 yrs age 33-67, age 72 and older at high risk

## 2017-12-19 ENCOUNTER — TELEPHONE (OUTPATIENT)
Dept: FAMILY MEDICINE CLINIC | Facility: CLINIC | Age: 72
End: 2017-12-19

## 2017-12-19 NOTE — TELEPHONE ENCOUNTER
Patient stated she had her colonoscopy done by Dr Gil Grady. I faxed a request to his office and they responded stating they had no record of colonoscopy.  Please reach out to patient to see if she has a copy of the report or if there is another provider

## 2017-12-20 NOTE — TELEPHONE ENCOUNTER
Called pt to inform her that Dr Irving Hobbs has NO record of Colonscopy Report. She states that they should so she will call there today herself to find result

## 2017-12-21 ENCOUNTER — HOSPITAL ENCOUNTER (OUTPATIENT)
Dept: ULTRASOUND IMAGING | Age: 72
Discharge: HOME | End: 2017-12-21
Attending: FAMILY MEDICINE
Payer: MEDICARE

## 2017-12-21 DIAGNOSIS — R42 DIZZINESS: ICD-10-CM

## 2017-12-21 PROCEDURE — 93880 EXTRACRANIAL BILAT STUDY: CPT | Performed by: FAMILY MEDICINE

## 2017-12-22 DIAGNOSIS — E04.1 NODULE OF LEFT LOBE OF THYROID GLAND: Primary | ICD-10-CM

## 2017-12-26 NOTE — TELEPHONE ENCOUNTER
Spoke to pt's spouse and he states that he is calling Dr Michael Johnson office to get results (will call us back w/status)

## 2017-12-27 ENCOUNTER — PRIOR ORIGINAL RECORDS (OUTPATIENT)
Dept: OTHER | Age: 72
End: 2017-12-27

## 2017-12-28 ENCOUNTER — HOSPITAL ENCOUNTER (OUTPATIENT)
Dept: ULTRASOUND IMAGING | Age: 72
Discharge: HOME | End: 2017-12-28
Attending: FAMILY MEDICINE
Payer: MEDICARE

## 2017-12-28 DIAGNOSIS — E04.1 NODULE OF LEFT LOBE OF THYROID GLAND: ICD-10-CM

## 2017-12-28 DIAGNOSIS — E04.1 THYROID NODULE: Primary | ICD-10-CM

## 2017-12-28 PROCEDURE — 76536 US EXAM OF HEAD AND NECK: CPT | Performed by: FAMILY MEDICINE

## 2018-01-18 ENCOUNTER — HOSPITAL ENCOUNTER (OUTPATIENT)
Dept: ULTRASOUND IMAGING | Facility: HOSPITAL | Age: 73
Discharge: HOME | End: 2018-01-18
Attending: OTOLARYNGOLOGY
Payer: MEDICARE

## 2018-01-18 DIAGNOSIS — E07.9 THYROID DYSFUNCTION: ICD-10-CM

## 2018-01-18 DIAGNOSIS — E04.1 THYROID NODULE: ICD-10-CM

## 2018-01-18 PROCEDURE — 10022 US FNA THYROID (CPT=10022/76942): CPT | Performed by: OTOLARYNGOLOGY

## 2018-01-18 PROCEDURE — 88173 CYTOPATH EVAL FNA REPORT: CPT | Performed by: OTOLARYNGOLOGY

## 2018-01-18 PROCEDURE — 76942 ECHO GUIDE FOR BIOPSY: CPT | Performed by: OTOLARYNGOLOGY

## 2018-01-18 NOTE — PROCEDURES
[facility]  Procedure Note    [facility] Patient Status:  Outpatient    1945 MRN [id number]   Location [address] [place] [address] Attending Rafael Sanderson MD   Hosp Day # 0 PCP Marv Barney MD     BIOPSY NEEDLE: 25 gauge  SP

## 2018-01-26 DIAGNOSIS — E78.00 PURE HYPERCHOLESTEROLEMIA: Primary | ICD-10-CM

## 2018-01-26 RX ORDER — PRAVASTATIN SODIUM 40 MG
TABLET ORAL
Qty: 30 TABLET | Refills: 5 | Status: SHIPPED | OUTPATIENT
Start: 2018-01-26 | End: 2018-07-24

## 2018-01-26 NOTE — TELEPHONE ENCOUNTER
Rx request for med Pravastatin Sodium 40mg # 30 with 11 refills from [facility] DR RUPA QUINTANILLA. Last refilled on 10/15/16 # 30 with 11 refills. LOV 12/18/17 with Dr. Rajesh Nguyen for annual health exam and hypercholesterolemia, f/u in 6 months.      Last Lipid and CMP

## 2018-02-23 ENCOUNTER — TELEPHONE (OUTPATIENT)
Dept: FAMILY MEDICINE CLINIC | Facility: CLINIC | Age: 73
End: 2018-02-23

## 2018-03-01 ENCOUNTER — OFFICE VISIT (OUTPATIENT)
Dept: FAMILY MEDICINE CLINIC | Facility: CLINIC | Age: 73
End: 2018-03-01

## 2018-03-01 VITALS
DIASTOLIC BLOOD PRESSURE: 60 MMHG | SYSTOLIC BLOOD PRESSURE: 110 MMHG | HEART RATE: 72 BPM | WEIGHT: 146 LBS | BODY MASS INDEX: 25.23 KG/M2 | HEIGHT: 63.9 IN | TEMPERATURE: 98 F

## 2018-03-01 DIAGNOSIS — Z12.11 COLON CANCER SCREENING: ICD-10-CM

## 2018-03-01 DIAGNOSIS — Z00.00 ENCOUNTER FOR ANNUAL HEALTH EXAMINATION: Primary | ICD-10-CM

## 2018-03-01 DIAGNOSIS — E78.00 PURE HYPERCHOLESTEROLEMIA: ICD-10-CM

## 2018-03-01 DIAGNOSIS — I49.3 PVC (PREMATURE VENTRICULAR CONTRACTION): ICD-10-CM

## 2018-03-01 DIAGNOSIS — E04.1 THYROID NODULE: ICD-10-CM

## 2018-03-01 DIAGNOSIS — M51.34 DEGENERATIVE DISC DISEASE, THORACIC: ICD-10-CM

## 2018-03-01 DIAGNOSIS — I34.1 MVP (MITRAL VALVE PROLAPSE): ICD-10-CM

## 2018-03-01 PROBLEM — K76.89 HEPATIC CYST: Status: RESOLVED | Noted: 2017-12-13 | Resolved: 2018-03-01

## 2018-03-01 PROCEDURE — 96160 PT-FOCUSED HLTH RISK ASSMT: CPT | Performed by: FAMILY MEDICINE

## 2018-03-01 PROCEDURE — G0439 PPPS, SUBSEQ VISIT: HCPCS | Performed by: FAMILY MEDICINE

## 2018-03-01 NOTE — PATIENT INSTRUCTIONS
Kylah Zarco's SCREENING SCHEDULE   Tests on this list are recommended by your physician but may not be covered, or covered at this frequency, by your insurer. Please check with your insurance carrier before scheduling to verify coverage.    P Covered every 10 years- more often if abnormal Colonoscopy,10 Years due on 12/01/1993 Update [facility] if applicable    Flex Sigmoidoscopy Screen  Covered every 5 years No results found for this or any previous visit. No flowsheet data found. get once after your 65th birthday    Pneumococcal 23 (Pneumovax)  Covered Once after 72   Orders placed or performed in visit on 12/18/17  -PNEUMOCOCCAL IMM (PNEUMOVAX)    Please get once after your 65th birthday    Hepatitis B for Moderate/High Risk

## 2018-03-01 NOTE — PROGRESS NOTES
HPI:   India Lyman is a 74year old female who presents for a MA (Medicare Advantage) [facility] (Once per calendar year). MVP:  Cardiology has instructed her to take carvedilol 3.125 prn palpitations. She does not take it regularly. Family/surrogate (if present), and forms available to patient in AVS       She does NOT have a Power of  for [employer] on file in [place].    Advance care planning including the explanation and discussion of advance directives standard forms performe Osteoporosis; and Other and unspecified hyperlipidemia. She  has a past surgical history that includes total abdom hysterectomy; cystoscopy,+ureteroscopy (Right, 4/17/17); hysterectomy; and oophorectomy. Her family history is not on file.    SOCIAL HI CONDITIONS:   Heraclio Andre is a 74year old female who presents for a Medicare Assessment. PLAN SUMMARY:   1. Encounter for annual health examination    2. Colon cancer screening  FIT ordered  - OCCULT BLOOD, FECAL, IMMUNOASSAY;  Future    3 due on 12/01/1993 Update Health Maintenance if applicable    Flex Sigmoidoscopy Screen every 10 years No results found for this or any previous visit. No flowsheet data found.      Fecal Occult Blood Annually No results found for: FOB No flowsheet data foun history found This may be covered with your pharmacy  prescription benefits                    Template: [employer] MEDICARE ANNUAL ASSESSMENT FEMALE [07359]

## 2018-07-24 ENCOUNTER — OFFICE VISIT (OUTPATIENT)
Dept: FAMILY MEDICINE CLINIC | Facility: CLINIC | Age: 73
End: 2018-07-24

## 2018-07-24 VITALS
SYSTOLIC BLOOD PRESSURE: 106 MMHG | WEIGHT: 144 LBS | DIASTOLIC BLOOD PRESSURE: 66 MMHG | HEIGHT: 64.5 IN | BODY MASS INDEX: 24.29 KG/M2 | HEART RATE: 72 BPM | RESPIRATION RATE: 16 BRPM | TEMPERATURE: 99 F

## 2018-07-24 DIAGNOSIS — R07.89 CHEST WALL PAIN: Primary | ICD-10-CM

## 2018-07-24 DIAGNOSIS — E78.00 PURE HYPERCHOLESTEROLEMIA: ICD-10-CM

## 2018-07-24 PROCEDURE — 99214 OFFICE O/P EST MOD 30 MIN: CPT | Performed by: FAMILY MEDICINE

## 2018-07-24 RX ORDER — PRAVASTATIN SODIUM 40 MG
TABLET ORAL
Qty: 90 TABLET | Refills: 3 | Status: SHIPPED | OUTPATIENT
Start: 2018-07-24 | End: 2018-11-05

## 2018-07-24 NOTE — PROGRESS NOTES
Colonel Martinez is a 74year old female. HPI:   Patient is here with her . She has a complaint of a burning sensation over the left collarbone and left chest wall. Her symptoms are intermittent.   Symptoms have occurred 2-3 different t her to discuss the symptoms with her cardiologist.  I feel a stress test would rule out a cardiac etiology. She is not having symptoms currently, but in the future recommend use of anti-inflammatories and heat.   If symptoms persist, she needs to follow-up

## 2018-07-25 ENCOUNTER — MYAURORA ACCOUNT LINK (OUTPATIENT)
Dept: OTHER | Age: 73
End: 2018-07-25

## 2018-07-25 ENCOUNTER — PRIOR ORIGINAL RECORDS (OUTPATIENT)
Dept: OTHER | Age: 73
End: 2018-07-25

## 2018-08-31 ENCOUNTER — OFFICE VISIT (OUTPATIENT)
Dept: FAMILY MEDICINE CLINIC | Facility: CLINIC | Age: 73
End: 2018-08-31

## 2018-08-31 VITALS
RESPIRATION RATE: 16 BRPM | TEMPERATURE: 99 F | SYSTOLIC BLOOD PRESSURE: 120 MMHG | BODY MASS INDEX: 25.84 KG/M2 | DIASTOLIC BLOOD PRESSURE: 60 MMHG | HEIGHT: 62.5 IN | HEART RATE: 60 BPM | WEIGHT: 144 LBS

## 2018-08-31 DIAGNOSIS — R31.0 GROSS HEMATURIA: ICD-10-CM

## 2018-08-31 DIAGNOSIS — N12 PYELONEPHRITIS: Primary | ICD-10-CM

## 2018-08-31 LAB
APPEARANCE: CLEAR
BILIRUBIN: NEGATIVE
GLUCOSE (URINE DIPSTICK): NEGATIVE MG/DL
KETONES (URINE DIPSTICK): NEGATIVE MG/DL
LEUKOCYTES: NEGATIVE
MULTISTIX LOT#: ABNORMAL NUMERIC
NITRITE, URINE: NEGATIVE
PH, URINE: 7 (ref 4.5–8)
SPECIFIC GRAVITY: 1.01 (ref 1–1.03)
UROBILINOGEN,SEMI-QN: 1 MG/DL (ref 0–1.9)

## 2018-08-31 PROCEDURE — 99213 OFFICE O/P EST LOW 20 MIN: CPT | Performed by: FAMILY MEDICINE

## 2018-08-31 PROCEDURE — 87086 URINE CULTURE/COLONY COUNT: CPT | Performed by: FAMILY MEDICINE

## 2018-08-31 PROCEDURE — 81003 URINALYSIS AUTO W/O SCOPE: CPT | Performed by: FAMILY MEDICINE

## 2018-08-31 RX ORDER — CIPROFLOXACIN 500 MG/1
500 TABLET, FILM COATED ORAL 2 TIMES DAILY
Qty: 14 TABLET | Refills: 0 | Status: SHIPPED | OUTPATIENT
Start: 2018-08-31 | End: 2018-11-05 | Stop reason: ALTCHOICE

## 2018-08-31 NOTE — PROGRESS NOTES
Chief Complaint:  Patient presents with:  Low Back Pain: Feeling weak, x 1 week, notice blood in urine, pt states she does not drink alot of water    HPI:  This is a 74year old female patient presenting for Low Back Pain (Feeling weak, x 1 week, notice bl times daily. Disp: 14 tablet Rfl: 0   Pravastatin Sodium 40 MG Oral Tab TAKE ONE TABLET BY MOUTH AT BEDTIME Disp: 90 tablet Rfl: 3   vitamin E 400 UNITS Oral Cap Take 1,000 Units by mouth.  Disp:  Rfl:      Allergies:  No Known Allergies    EXAM:   08/31/18 Advised the following:  Travis Henry was seen today for low back pain. Diagnoses and all orders for this visit:    Pyelonephritis; Gross hematuria  Concerned for pyelonephritis. Will treat with cipro and send for culture.  However, cannot exclude ureter

## 2018-09-24 PROCEDURE — 86376 MICROSOMAL ANTIBODY EACH: CPT | Performed by: OTOLARYNGOLOGY

## 2018-09-24 PROCEDURE — 36415 COLL VENOUS BLD VENIPUNCTURE: CPT | Performed by: OTOLARYNGOLOGY

## 2018-11-05 ENCOUNTER — OFFICE VISIT (OUTPATIENT)
Dept: FAMILY MEDICINE CLINIC | Facility: CLINIC | Age: 73
End: 2018-11-05

## 2018-11-05 VITALS
HEIGHT: 60 IN | DIASTOLIC BLOOD PRESSURE: 58 MMHG | SYSTOLIC BLOOD PRESSURE: 112 MMHG | WEIGHT: 146 LBS | HEART RATE: 60 BPM | BODY MASS INDEX: 28.66 KG/M2 | RESPIRATION RATE: 18 BRPM

## 2018-11-05 DIAGNOSIS — Z23 NEEDS FLU SHOT: Primary | ICD-10-CM

## 2018-11-05 DIAGNOSIS — E78.00 PURE HYPERCHOLESTEROLEMIA: ICD-10-CM

## 2018-11-05 PROCEDURE — 90653 IIV ADJUVANT VACCINE IM: CPT | Performed by: FAMILY MEDICINE

## 2018-11-05 PROCEDURE — G0008 ADMIN INFLUENZA VIRUS VAC: HCPCS | Performed by: FAMILY MEDICINE

## 2018-11-05 PROCEDURE — 99213 OFFICE O/P EST LOW 20 MIN: CPT | Performed by: FAMILY MEDICINE

## 2018-11-05 RX ORDER — PRAVASTATIN SODIUM 40 MG
TABLET ORAL
Qty: 90 TABLET | Refills: 3 | Status: SHIPPED | OUTPATIENT
Start: 2018-11-05 | End: 2020-07-10

## 2018-11-05 NOTE — PROGRESS NOTES
Ananya Herbert is a 74year old female. HPI:   Patient presents for a medication follow up. Needs pravastatin refill for lipids. Due for panel. No side effects. Feels well otherwise.       Patient Active Problem List:     MVP (mitral va

## 2019-02-26 ENCOUNTER — TELEPHONE (OUTPATIENT)
Dept: FAMILY MEDICINE CLINIC | Facility: CLINIC | Age: 74
End: 2019-02-26

## 2019-02-26 NOTE — TELEPHONE ENCOUNTER
Called patient and left message to contact the office to complete annual health assessment prior to appointment 03/04/19

## 2019-02-28 VITALS
HEART RATE: 60 BPM | DIASTOLIC BLOOD PRESSURE: 78 MMHG | SYSTOLIC BLOOD PRESSURE: 152 MMHG | BODY MASS INDEX: 27.44 KG/M2 | WEIGHT: 150 LBS

## 2019-02-28 VITALS
SYSTOLIC BLOOD PRESSURE: 144 MMHG | HEIGHT: 63 IN | DIASTOLIC BLOOD PRESSURE: 90 MMHG | WEIGHT: 148 LBS | HEART RATE: 56 BPM | BODY MASS INDEX: 26.22 KG/M2

## 2019-02-28 VITALS
WEIGHT: 145 LBS | DIASTOLIC BLOOD PRESSURE: 70 MMHG | SYSTOLIC BLOOD PRESSURE: 126 MMHG | HEIGHT: 63 IN | BODY MASS INDEX: 25.69 KG/M2 | HEART RATE: 56 BPM

## 2019-03-14 ENCOUNTER — OFFICE VISIT (OUTPATIENT)
Dept: FAMILY MEDICINE CLINIC | Facility: CLINIC | Age: 74
End: 2019-03-14
Payer: MEDICARE

## 2019-03-14 VITALS
RESPIRATION RATE: 20 BRPM | BODY MASS INDEX: 28.47 KG/M2 | OXYGEN SATURATION: 97 % | WEIGHT: 145 LBS | HEIGHT: 60 IN | HEART RATE: 60 BPM | TEMPERATURE: 98 F | SYSTOLIC BLOOD PRESSURE: 136 MMHG | DIASTOLIC BLOOD PRESSURE: 72 MMHG

## 2019-03-14 DIAGNOSIS — R31.0 GROSS HEMATURIA: Primary | ICD-10-CM

## 2019-03-14 DIAGNOSIS — R35.1 NOCTURIA: ICD-10-CM

## 2019-03-14 LAB
APPEARANCE: CLEAR
MULTISTIX LOT#: NORMAL NUMERIC
PH, URINE: 7 (ref 4.5–8)
SPECIFIC GRAVITY: 1.01 (ref 1–1.03)
URINE-COLOR: YELLOW
UROBILINOGEN,SEMI-QN: 0.2 MG/DL (ref 0–1.9)

## 2019-03-14 PROCEDURE — 99213 OFFICE O/P EST LOW 20 MIN: CPT | Performed by: PHYSICIAN ASSISTANT

## 2019-03-14 PROCEDURE — 87086 URINE CULTURE/COLONY COUNT: CPT | Performed by: PHYSICIAN ASSISTANT

## 2019-03-14 PROCEDURE — 81003 URINALYSIS AUTO W/O SCOPE: CPT | Performed by: PHYSICIAN ASSISTANT

## 2019-03-14 RX ORDER — NITROFURANTOIN 25; 75 MG/1; MG/1
100 CAPSULE ORAL 2 TIMES DAILY
Qty: 10 CAPSULE | Refills: 0 | Status: SHIPPED | OUTPATIENT
Start: 2019-03-14 | End: 2019-11-16

## 2019-03-14 NOTE — PROGRESS NOTES
CC: Hematuria     HISTORY OF PRESENT ILLNESS  Colonel Martinez is a 74year old female who presents for evaluation of hematuria. She noticed that her urine appeared a bit darker today but has in the last few days as well.  She has been trying to pus No CVA tenderness.     Labs:   Office Visit on 03/14/2019   Component Date Value Ref Range Status   • Glucose Urine 03/14/2019 neg  mg/dL Final   • Bilirubin Urine 03/14/2019 neg  Negative Final   • Ketones, UA 03/14/2019 neg  Negative mg/dL Final   • Spec

## 2019-04-09 RX ORDER — AMLODIPINE BESYLATE 5 MG/1
1 TABLET ORAL DAILY
COMMUNITY
Start: 2017-12-27

## 2019-04-09 RX ORDER — MECLIZINE HYDROCHLORIDE 25 MG/1
1 TABLET ORAL 3 TIMES DAILY PRN
COMMUNITY
Start: 2017-11-24

## 2019-04-09 RX ORDER — VITAMIN E 268 MG
1 CAPSULE ORAL DAILY
COMMUNITY
Start: 2017-11-24

## 2019-04-09 RX ORDER — PRAVASTATIN SODIUM 40 MG
1 TABLET ORAL DAILY
COMMUNITY
Start: 2017-11-24

## 2019-04-15 ENCOUNTER — OFFICE VISIT (OUTPATIENT)
Dept: FAMILY MEDICINE CLINIC | Facility: CLINIC | Age: 74
End: 2019-04-15
Payer: MEDICARE

## 2019-04-15 VITALS
TEMPERATURE: 98 F | WEIGHT: 144 LBS | BODY MASS INDEX: 25.2 KG/M2 | SYSTOLIC BLOOD PRESSURE: 122 MMHG | DIASTOLIC BLOOD PRESSURE: 70 MMHG | HEIGHT: 63.5 IN | RESPIRATION RATE: 16 BRPM | HEART RATE: 60 BPM

## 2019-04-15 DIAGNOSIS — R03.0 ELEVATED BP WITHOUT DIAGNOSIS OF HYPERTENSION: ICD-10-CM

## 2019-04-15 DIAGNOSIS — R31.9 HEMATURIA, UNSPECIFIED TYPE: Primary | ICD-10-CM

## 2019-04-15 DIAGNOSIS — E78.00 PURE HYPERCHOLESTEROLEMIA: ICD-10-CM

## 2019-04-15 PROCEDURE — 81001 URINALYSIS AUTO W/SCOPE: CPT | Performed by: FAMILY MEDICINE

## 2019-04-15 PROCEDURE — 99214 OFFICE O/P EST MOD 30 MIN: CPT | Performed by: FAMILY MEDICINE

## 2019-04-15 NOTE — PROGRESS NOTES
Rafeal Mir is a 74year old female. HPI:   Patient presents for a medication follow up. Home readings elevated - sometimes up to 160-170.   She is checking about 2x/week, usually at night and usually when she is irritated (because she i this Visit:  Requested Prescriptions      No prescriptions requested or ordered in this encounter     Imaging & Consults:  None

## 2019-04-22 ENCOUNTER — TELEPHONE (OUTPATIENT)
Dept: FAMILY MEDICINE CLINIC | Facility: CLINIC | Age: 74
End: 2019-04-22

## 2019-04-26 ENCOUNTER — OFFICE VISIT (OUTPATIENT)
Dept: FAMILY MEDICINE CLINIC | Facility: CLINIC | Age: 74
End: 2019-04-26
Payer: MEDICARE

## 2019-04-26 VITALS
BODY MASS INDEX: 24.67 KG/M2 | DIASTOLIC BLOOD PRESSURE: 60 MMHG | HEIGHT: 63.5 IN | HEART RATE: 60 BPM | TEMPERATURE: 98 F | WEIGHT: 141 LBS | RESPIRATION RATE: 16 BRPM | SYSTOLIC BLOOD PRESSURE: 100 MMHG

## 2019-04-26 DIAGNOSIS — E78.00 PURE HYPERCHOLESTEROLEMIA: ICD-10-CM

## 2019-04-26 DIAGNOSIS — Z00.00 ENCOUNTER FOR ANNUAL HEALTH EXAMINATION: Primary | ICD-10-CM

## 2019-04-26 DIAGNOSIS — M51.34 DEGENERATIVE DISC DISEASE, THORACIC: ICD-10-CM

## 2019-04-26 DIAGNOSIS — R31.29 MICROSCOPIC HEMATURIA: ICD-10-CM

## 2019-04-26 DIAGNOSIS — Z12.31 VISIT FOR SCREENING MAMMOGRAM: ICD-10-CM

## 2019-04-26 DIAGNOSIS — I49.3 PVC (PREMATURE VENTRICULAR CONTRACTION): ICD-10-CM

## 2019-04-26 DIAGNOSIS — I34.1 MVP (MITRAL VALVE PROLAPSE): ICD-10-CM

## 2019-04-26 PROCEDURE — G0439 PPPS, SUBSEQ VISIT: HCPCS | Performed by: FAMILY MEDICINE

## 2019-04-26 PROCEDURE — 96160 PT-FOCUSED HLTH RISK ASSMT: CPT | Performed by: FAMILY MEDICINE

## 2019-04-26 PROCEDURE — 99397 PER PM REEVAL EST PAT 65+ YR: CPT | Performed by: FAMILY MEDICINE

## 2019-04-26 NOTE — PATIENT INSTRUCTIONS
Kylah Zarco's SCREENING SCHEDULE   Tests on this list are recommended by your physician but may not be covered, or covered at this frequency, by your insurer. Please check with your insurance carrier before scheduling to verify coverage.    P every 10 years- more often if abnormal Colonoscopy due on 12/01/1945 Update [facility] if applicable    Flex Sigmoidoscopy Screen  Covered every 5 years No results found for this or any previous visit. No flowsheet data found.      Fecal Occult Bloo MILES IM    Please get once after your 65th birthday    Pneumococcal 23 (Pneumovax)  Covered Once after 65 Orders placed or performed in visit on 12/18/17   • PNEUMOCOCCAL IMM (PNEUMOVAX)    Please get once after your 65th birthday    Hepatitis B for Ilya Tolentino

## 2019-04-26 NOTE — PROGRESS NOTES
HPI:   Lawyer Mora is a 74year old female who presents for a MA (Medicare Advantage) [facility] (Once per calendar year). BPs at home have higher, high 130s-150s. Readings normal here.   Home cuff reading 15-20 points higher when compare does have a POA but we do NOT have it on file in [address]. She has never smoked tobacco.    CAGE Alcohol screening   Peg Avendaño was screened for Alcohol abuse and had a score of 0 so is at low risk.     Patient Care Team: Patient Care 0.6 oz of alcohol per week. She reports that she does not use drugs. REVIEW OF SYSTEMS:   GEN:  No fever or fatigue  HEENT:  No vision or hearing concerns. No dental problems.   HEART:  No chest pain or palpitations  LUNG:  No SOB, cough or wheeze  GI: hematuria  F/u with urology  - UROLOGY - INTERNAL    3. Visit for screening mammogram  ordered  - [facility] SCREENING BILAT (CPT=77067); Future    4. Pure hypercholesterolemia  Due for labs  - LIPID PANEL  - COMP METABOLIC PANEL (14)    5.  MVP (mitral valve prol found.    Glaucoma Screening      Ophthalmology Visit Annually: Diabetics, FHx Glaucoma, AA>50, > 65 No flowsheet data found.     Bone Density Screening      Dexascan Every two years Last Dexa Scan:   XR DEXA BONE DENSITOMETRY (CPT=77080) 09/17/2016

## 2019-05-24 ENCOUNTER — TELEPHONE (OUTPATIENT)
Dept: FAMILY MEDICINE CLINIC | Facility: CLINIC | Age: 74
End: 2019-05-24

## 2019-05-24 ENCOUNTER — OFFICE VISIT (OUTPATIENT)
Dept: FAMILY MEDICINE CLINIC | Facility: CLINIC | Age: 74
End: 2019-05-24
Payer: MEDICARE

## 2019-05-24 VITALS
DIASTOLIC BLOOD PRESSURE: 70 MMHG | TEMPERATURE: 98 F | BODY MASS INDEX: 24.85 KG/M2 | WEIGHT: 142 LBS | HEIGHT: 63.5 IN | HEART RATE: 72 BPM | SYSTOLIC BLOOD PRESSURE: 128 MMHG

## 2019-05-24 DIAGNOSIS — R31.29 MICROSCOPIC HEMATURIA: ICD-10-CM

## 2019-05-24 DIAGNOSIS — R10.13 DYSPEPSIA: Primary | ICD-10-CM

## 2019-05-24 PROCEDURE — 99214 OFFICE O/P EST MOD 30 MIN: CPT | Performed by: PHYSICIAN ASSISTANT

## 2019-05-24 NOTE — TELEPHONE ENCOUNTER
Pharmacist states that Omeprazole dispersible tablets are not available. Advised that she change Rx to Omeprazole tablets.

## 2019-05-24 NOTE — TELEPHONE ENCOUNTER
[facility] called requesting to clarify script for Omeprazole 20 MG Oral Tablet Delayed Release Dispersible

## 2019-05-26 NOTE — PROGRESS NOTES
CC: Abdominal bloating     HISTORY OF PRESENT ILLNESS  Brayden Shaw is a 74year old female who presents for evaluation of abdominal bloating for past 6 days. She believes that it started after she ate a hot dog, which she normally does not eat.  She dressed. CARDIOVASCULAR: Regular rate and rhythm. PULMONOLOGY: Normal effort on room air. Clear to auscultation bilaterally. ABDOMEN: Soft, nontender, nondistended. No hepatosplenomegaly. No CVA tenderness. ASSESSMENT/ PLAN  1.  Dyspepsia  Discussed

## 2019-07-24 ENCOUNTER — OFFICE VISIT (OUTPATIENT)
Dept: FAMILY MEDICINE CLINIC | Facility: CLINIC | Age: 74
End: 2019-07-24
Payer: MEDICARE

## 2019-07-24 VITALS
DIASTOLIC BLOOD PRESSURE: 62 MMHG | RESPIRATION RATE: 16 BRPM | HEART RATE: 60 BPM | BODY MASS INDEX: 25.33 KG/M2 | HEIGHT: 62.5 IN | WEIGHT: 141.19 LBS | SYSTOLIC BLOOD PRESSURE: 128 MMHG | TEMPERATURE: 98 F

## 2019-07-24 DIAGNOSIS — R07.89 CHEST TIGHTNESS: Primary | ICD-10-CM

## 2019-07-24 DIAGNOSIS — Z13.29 SCREENING FOR THYROID DISORDER: ICD-10-CM

## 2019-07-24 DIAGNOSIS — R73.01 ELEVATED FASTING GLUCOSE: ICD-10-CM

## 2019-07-24 DIAGNOSIS — R53.83 FATIGUE, UNSPECIFIED TYPE: ICD-10-CM

## 2019-07-24 DIAGNOSIS — Z13.1 SCREENING FOR DIABETES MELLITUS: ICD-10-CM

## 2019-07-24 DIAGNOSIS — R35.89 POLYURIA: ICD-10-CM

## 2019-07-24 PROCEDURE — 99214 OFFICE O/P EST MOD 30 MIN: CPT | Performed by: PHYSICIAN ASSISTANT

## 2019-07-24 PROCEDURE — 93000 ELECTROCARDIOGRAM COMPLETE: CPT | Performed by: PHYSICIAN ASSISTANT

## 2019-07-24 RX ORDER — OMEPRAZOLE 20 MG/1
1 CAPSULE, DELAYED RELEASE ORAL AS NEEDED
Refills: 0 | Status: ON HOLD | COMMUNITY
Start: 2019-05-24 | End: 2021-01-21

## 2019-07-25 ENCOUNTER — TELEPHONE (OUTPATIENT)
Dept: FAMILY MEDICINE CLINIC | Facility: CLINIC | Age: 74
End: 2019-07-25

## 2019-07-25 NOTE — TELEPHONE ENCOUNTER
Patient is currently at [facility] to have blood drawn but with the A1C and Thyroid medicare may not cover under the current diagnosis, looking for another diagnosis code

## 2019-07-26 LAB
ALBUMIN/GLOBULIN RATIO: 1.7 (CALC) (ref 1–2.5)
ALBUMIN: 4.5 G/DL (ref 3.6–5.1)
ALKALINE PHOSPHATASE: 71 U/L (ref 33–130)
ALT: 18 U/L (ref 6–29)
AST: 27 U/L (ref 10–35)
BILIRUBIN, TOTAL: 0.6 MG/DL (ref 0.2–1.2)
BUN: 15 MG/DL (ref 7–25)
CALCIUM: 9.6 MG/DL (ref 8.6–10.4)
CARBON DIOXIDE: 28 MMOL/L (ref 20–32)
CHLORIDE: 105 MMOL/L (ref 98–110)
CREATININE: 0.9 MG/DL (ref 0.6–0.93)
EGFR IF AFRICN AM: 72 ML/MIN/1.73M2
EGFR IF NONAFRICN AM: 63 ML/MIN/1.73M2
GLOBULIN: 2.6 G/DL (CALC) (ref 1.9–3.7)
GLUCOSE: 95 MG/DL (ref 65–99)
HEMOGLOBIN A1C: 6 % OF TOTAL HGB
POTASSIUM: 4.3 MMOL/L (ref 3.5–5.3)
PROTEIN, TOTAL: 7.1 G/DL (ref 6.1–8.1)
SODIUM: 142 MMOL/L (ref 135–146)
TSH W/REFLEX TO FT4: 2.12 MIU/L (ref 0.4–4.5)

## 2019-07-26 NOTE — PROGRESS NOTES
CC: Left arm/ shoulder/ chest wall pain     HISTORY OF PRESENT ILLNESS  Adriane العراقي is a 74year old female who presents for evaluation of left sided chest/ shoulder pain. Two episodes of this.  The first occurred about a week ago while walking at a on room air. Clear to auscultation bilaterally. CHEST WALL: Unable to elicit tenderness. ABDOMEN: Soft, nontender, nondistended. No hepatosplenomegaly. No CVA tenderness. EXTREMITIES: 2+ radial pulses bilaterally.     EKG: NSR at 57 bpm. 1st degree AV b

## 2019-11-15 ENCOUNTER — TELEPHONE (OUTPATIENT)
Dept: FAMILY MEDICINE CLINIC | Facility: CLINIC | Age: 74
End: 2019-11-15

## 2019-11-15 NOTE — TELEPHONE ENCOUNTER
c/o discomfort with urination beginning yesterday along with frequency. Feeling uncomfortable. Asking for an appointment with  for next week. Advised that she should be seen today at a [facility].   She would rather come for an appointment to this office

## 2019-11-16 ENCOUNTER — OFFICE VISIT (OUTPATIENT)
Dept: FAMILY MEDICINE CLINIC | Facility: CLINIC | Age: 74
End: 2019-11-16
Payer: MEDICARE

## 2019-11-16 VITALS
SYSTOLIC BLOOD PRESSURE: 112 MMHG | HEIGHT: 62.5 IN | BODY MASS INDEX: 25.66 KG/M2 | RESPIRATION RATE: 17 BRPM | DIASTOLIC BLOOD PRESSURE: 66 MMHG | TEMPERATURE: 98 F | WEIGHT: 143 LBS | HEART RATE: 69 BPM

## 2019-11-16 DIAGNOSIS — N30.01 ACUTE CYSTITIS WITH HEMATURIA: Primary | ICD-10-CM

## 2019-11-16 DIAGNOSIS — R39.9 UTI SYMPTOMS: ICD-10-CM

## 2019-11-16 PROCEDURE — 81003 URINALYSIS AUTO W/O SCOPE: CPT | Performed by: FAMILY MEDICINE

## 2019-11-16 PROCEDURE — 99213 OFFICE O/P EST LOW 20 MIN: CPT | Performed by: FAMILY MEDICINE

## 2019-11-16 RX ORDER — NITROFURANTOIN 25; 75 MG/1; MG/1
100 CAPSULE ORAL 2 TIMES DAILY
Qty: 10 CAPSULE | Refills: 0 | Status: SHIPPED | OUTPATIENT
Start: 2019-11-16 | End: 2019-11-21

## 2019-11-16 NOTE — PROGRESS NOTES
James Peacock is a 74year old female. S:  Patient presents today with the following concerns:  UTI (thursday , urgency ) and urinary frequency. Accompanied by her  today. She has had workup with Dr. Micah Tompkins, urologist, for hematuria.   Wor Marylen Raya is a 74year old female.   Uti symptoms  Acute cystitis with hematuria  (primary encounter diagnosis)    Orders Placed This Encounter      Urine Dip, auto without Micro      URINE CULTURE, ROUTINE [395] [Q]    Meds & Refills for this Visit:  Reques

## 2019-11-19 ENCOUNTER — TELEPHONE (OUTPATIENT)
Dept: FAMILY MEDICINE CLINIC | Facility: CLINIC | Age: 74
End: 2019-11-19

## 2019-11-19 ENCOUNTER — OFFICE VISIT (OUTPATIENT)
Dept: FAMILY MEDICINE CLINIC | Facility: CLINIC | Age: 74
End: 2019-11-19
Payer: MEDICARE

## 2019-11-19 VITALS
SYSTOLIC BLOOD PRESSURE: 120 MMHG | TEMPERATURE: 97 F | DIASTOLIC BLOOD PRESSURE: 78 MMHG | HEART RATE: 60 BPM | RESPIRATION RATE: 12 BRPM

## 2019-11-19 DIAGNOSIS — N30.01 ACUTE CYSTITIS WITH HEMATURIA: Primary | ICD-10-CM

## 2019-11-19 PROCEDURE — 99213 OFFICE O/P EST LOW 20 MIN: CPT | Performed by: PHYSICIAN ASSISTANT

## 2019-11-20 NOTE — PROGRESS NOTES
Patient presents with:  UTI: f/u        HISTORY OF PRESENT ILLNESS  Sarnaya Lawrence is a 74year old female who presents for UTI follow up. She was seen by colleague Niharika Rasmussen PAC on 11/17. UA and UC consistent with >100,000 CFU/ mL E.  Coli infecti Regular rate and rhythm. PULMONOLOGY: Normal effort on room air. Clear to auscultation bilaterally. ABDOMEN: Soft, nontender, nondistended. No hepatosplenomegaly. No CVA tenderness.     Labs:   Office Visit on 11/16/2019   Component Date Value Ref Range

## 2019-12-03 ENCOUNTER — TELEPHONE (OUTPATIENT)
Dept: FAMILY MEDICINE CLINIC | Facility: CLINIC | Age: 74
End: 2019-12-03

## 2019-12-03 DIAGNOSIS — N39.0 CHRONIC UTI: Primary | ICD-10-CM

## 2019-12-03 NOTE — PROGRESS NOTES
Discussed Urinalysis results with Faheem Sotelo by phone letting her know her urine shows persistent infection and that Bactrim was sent to the pharmacy per Maico Ash. Also told to recheck a UA in 2-3 weeks.  Repeat UA with Reflex ordered to Nikki.   A

## 2019-12-03 NOTE — TELEPHONE ENCOUNTER
Discussed Urinalysis results with Indigo Leal by phone letting her know her urine shows persistent infection and that Bactrim was sent to the pharmacy per Deric Lewis. Take 1 tablet twice a day for 5 days. Also told to recheck a UA in 2-3 weeks.  Repe

## 2019-12-03 NOTE — TELEPHONE ENCOUNTER
----- Message from Clemencia Rivera [place] sent at 11/29/2019 10:36 AM CST -----  Please call and let her know that her urine shows a persistent infection. I sent bactrim to the pharmacy for her. Should recheck a UA in 2-3 weeks.     Thanks,  Yair Weller

## 2020-02-07 ENCOUNTER — TELEPHONE (OUTPATIENT)
Dept: FAMILY MEDICINE CLINIC | Facility: CLINIC | Age: 75
End: 2020-02-07

## 2020-07-09 DIAGNOSIS — E78.00 PURE HYPERCHOLESTEROLEMIA: ICD-10-CM

## 2020-07-10 RX ORDER — PRAVASTATIN SODIUM 40 MG
TABLET ORAL
Qty: 90 TABLET | Refills: 3 | Status: SHIPPED | OUTPATIENT
Start: 2020-07-10 | End: 2021-02-18

## 2020-07-22 ENCOUNTER — TELEPHONE (OUTPATIENT)
Dept: FAMILY MEDICINE CLINIC | Facility: CLINIC | Age: 75
End: 2020-07-22

## 2020-07-28 ENCOUNTER — OFFICE VISIT (OUTPATIENT)
Dept: FAMILY MEDICINE CLINIC | Facility: CLINIC | Age: 75
End: 2020-07-28
Payer: MEDICARE

## 2020-07-28 VITALS
WEIGHT: 141 LBS | TEMPERATURE: 98 F | BODY MASS INDEX: 24.07 KG/M2 | DIASTOLIC BLOOD PRESSURE: 60 MMHG | HEIGHT: 64 IN | SYSTOLIC BLOOD PRESSURE: 90 MMHG | RESPIRATION RATE: 16 BRPM | HEART RATE: 60 BPM

## 2020-07-28 DIAGNOSIS — M51.34 DEGENERATIVE DISC DISEASE, THORACIC: ICD-10-CM

## 2020-07-28 DIAGNOSIS — R73.9 HYPERGLYCEMIA: ICD-10-CM

## 2020-07-28 DIAGNOSIS — Z12.31 VISIT FOR SCREENING MAMMOGRAM: ICD-10-CM

## 2020-07-28 DIAGNOSIS — E04.1 THYROID NODULE: ICD-10-CM

## 2020-07-28 DIAGNOSIS — Z00.00 ENCOUNTER FOR ANNUAL HEALTH EXAMINATION: Primary | ICD-10-CM

## 2020-07-28 DIAGNOSIS — R31.29 MICROSCOPIC HEMATURIA: ICD-10-CM

## 2020-07-28 DIAGNOSIS — E78.00 PURE HYPERCHOLESTEROLEMIA: ICD-10-CM

## 2020-07-28 DIAGNOSIS — Z78.0 POSTMENOPAUSAL ESTROGEN DEFICIENCY: ICD-10-CM

## 2020-07-28 DIAGNOSIS — I34.1 MVP (MITRAL VALVE PROLAPSE): ICD-10-CM

## 2020-07-28 DIAGNOSIS — I49.3 PVC (PREMATURE VENTRICULAR CONTRACTION): ICD-10-CM

## 2020-07-28 PROCEDURE — 96160 PT-FOCUSED HLTH RISK ASSMT: CPT | Performed by: FAMILY MEDICINE

## 2020-07-28 PROCEDURE — 99397 PER PM REEVAL EST PAT 65+ YR: CPT | Performed by: FAMILY MEDICINE

## 2020-07-28 PROCEDURE — 3078F DIAST BP <80 MM HG: CPT | Performed by: FAMILY MEDICINE

## 2020-07-28 PROCEDURE — 3008F BODY MASS INDEX DOCD: CPT | Performed by: FAMILY MEDICINE

## 2020-07-28 PROCEDURE — 3074F SYST BP LT 130 MM HG: CPT | Performed by: FAMILY MEDICINE

## 2020-07-28 PROCEDURE — G0439 PPPS, SUBSEQ VISIT: HCPCS | Performed by: FAMILY MEDICINE

## 2020-07-28 NOTE — PROGRESS NOTES
HPI:   Brigid Velasquez is a 66year old female who presents for a MA (Medicare Advantage) Supervisit (Once per calendar year). Hematuria:  Last saw Dr. Isma Bonilla in 6/2019. MVP:  Was told by Cardiology to take carvedilol 3.125 prn palpitations.   She d scanning into Epic. She does have a POA but we do NOT have it on file in [facility] She has never smoked tobacco.    CAGE Alcohol screening   Татьяна Madrid was screened for Alcohol abuse and had a score of 0 so is at low risk.     Toby has never smoked. She has never used smokeless tobacco. She reports current alcohol use of about 1.0 standard drinks of alcohol per week. She reports that she does not use drugs.      REVIEW OF SYSTEMS:   GEN:  No fever or fatigue  HEENT:  No vision or hear who presents for a Medicare Assessment. PLAN SUMMARY:   1. Encounter for annual health examination    2. Postmenopausal estrogen deficiency  - XR DEXA BONE DENSITOMETRY (CPT=77080); Future    3.  Visit for screening mammogram  - KILO SCREENING BILAT (CPT years There are no preventive care reminders to display for this patient. Update Health Maintenance if applicable    Flex Sigmoidoscopy Screen every 10 years No results found for this or any previous visit. No flowsheet data found.      Fecal Occult Blood A Medicare does not cover unless Medically needed    Zoster  Not covered by Medicare Part B No vaccine history found This may be covered with your pharmacy  prescription benefits                    Template: [address] [[address]]

## 2020-07-28 NOTE — PATIENT INSTRUCTIONS
Kylah Zarco's SCREENING SCHEDULE   Tests on this list are recommended by your physician but may not be covered, or covered at this frequency, by your insurer. Please check with your insurance carrier before scheduling to verify coverage.    PREVENT 73     Colonoscopy Screen   Covered every 10 years- more often if abnormal There are no preventive care reminders to display for this patient.  Update Health Maintenance if applicable    Flex Sigmoidoscopy Screen  Covered every 5 years No results found for Orders placed or performed in visit on 10/15/16   • PNEUMOCOCCAL VACC, 13 MILES IM    Please get once after your 65th birthday    Pneumococcal 23 (Pneumovax)  Covered Once after 65 Orders placed or performed in visit on 12/18/17   • PNEUMOCOCCAL IMM (Irving Bingham

## 2020-08-24 ENCOUNTER — TELEPHONE (OUTPATIENT)
Dept: FAMILY MEDICINE CLINIC | Facility: CLINIC | Age: 75
End: 2020-08-24

## 2020-08-24 NOTE — TELEPHONE ENCOUNTER
Patient with c/o abdominal pain, dizziness and weakness. Sx x 10 days. Pain in middle of abdomen. Pain is constant. Denies N/V/D/C. Drinking a lot of water. Eating, but has decreased appetite. Denies fever. No other sx.  Denies recent travel or contact with

## 2020-08-25 ENCOUNTER — OFFICE VISIT (OUTPATIENT)
Dept: FAMILY MEDICINE CLINIC | Facility: CLINIC | Age: 75
End: 2020-08-25
Payer: MEDICARE

## 2020-08-25 VITALS
SYSTOLIC BLOOD PRESSURE: 108 MMHG | BODY MASS INDEX: 23.97 KG/M2 | DIASTOLIC BLOOD PRESSURE: 70 MMHG | WEIGHT: 137 LBS | RESPIRATION RATE: 16 BRPM | HEIGHT: 63.5 IN | TEMPERATURE: 98 F | HEART RATE: 60 BPM

## 2020-08-25 DIAGNOSIS — R19.7 DIARRHEA OF PRESUMED INFECTIOUS ORIGIN: Primary | ICD-10-CM

## 2020-08-25 PROCEDURE — 3074F SYST BP LT 130 MM HG: CPT | Performed by: FAMILY MEDICINE

## 2020-08-25 PROCEDURE — 3008F BODY MASS INDEX DOCD: CPT | Performed by: FAMILY MEDICINE

## 2020-08-25 PROCEDURE — 99213 OFFICE O/P EST LOW 20 MIN: CPT | Performed by: FAMILY MEDICINE

## 2020-08-25 PROCEDURE — 3078F DIAST BP <80 MM HG: CPT | Performed by: FAMILY MEDICINE

## 2020-08-25 RX ORDER — DICYCLOMINE HYDROCHLORIDE 10 MG/1
10 CAPSULE ORAL
Qty: 40 CAPSULE | Refills: 0 | Status: SHIPPED | OUTPATIENT
Start: 2020-08-25 | End: 2020-09-04

## 2020-08-25 NOTE — PATIENT INSTRUCTIONS
ALIGN Probiotic - take one per day to help with cramping and diarrhea    Vital Proteins - Collagen peptides. I use for dry skin in the winter.

## 2020-08-25 NOTE — PROGRESS NOTES
Tanika Pena is a 66year old female. HPI:   Patient presents for a medication follow up.       Had salad from [employer], then developed diarrhea and cramps  Today is less; everyday getting a little better  No fever or vomiting, but afraid to eat

## 2020-11-09 ENCOUNTER — TELEPHONE (OUTPATIENT)
Dept: CASE MANAGEMENT | Age: 75
End: 2020-11-09

## 2021-01-18 ENCOUNTER — HOSPITAL ENCOUNTER (INPATIENT)
Facility: HOSPITAL | Age: 76
LOS: 3 days | Discharge: HOME | DRG: 390 | End: 2021-01-21
Attending: EMERGENCY MEDICINE | Admitting: HOSPITALIST
Payer: MEDICARE

## 2021-01-18 ENCOUNTER — APPOINTMENT (OUTPATIENT)
Dept: CT IMAGING | Age: 76
DRG: 390 | End: 2021-01-18
Attending: EMERGENCY MEDICINE
Payer: MEDICARE

## 2021-01-18 DIAGNOSIS — K56.600 PARTIAL INTESTINAL OBSTRUCTION, UNSPECIFIED CAUSE (HCC): Primary | ICD-10-CM

## 2021-01-18 DIAGNOSIS — R10.31 RLQ ABDOMINAL PAIN: ICD-10-CM

## 2021-01-18 DIAGNOSIS — R93.3 ABNORMAL CT SCAN, GASTROINTESTINAL TRACT: ICD-10-CM

## 2021-01-18 LAB
ALBUMIN SERPL-MCNC: 4.1 G/DL (ref 3.4–5)
ALBUMIN/GLOB SERPL: 1.1 {RATIO} (ref 1–2)
ALP LIVER SERPL-CCNC: 92 U/L
ALT SERPL-CCNC: 22 U/L
ANION GAP SERPL CALC-SCNC: 6 MMOL/L (ref 0–18)
AST SERPL-CCNC: 23 U/L (ref 15–37)
BASOPHILS # BLD AUTO: 0.04 X10(3) UL (ref 0–0.2)
BASOPHILS NFR BLD AUTO: 0.6 %
BILIRUB SERPL-MCNC: 0.5 MG/DL (ref 0.1–2)
BILIRUB UR QL STRIP.AUTO: NEGATIVE
BUN BLD-MCNC: 21 MG/DL (ref 7–18)
BUN/CREAT SERPL: 23.3 (ref 10–20)
CALCIUM BLD-MCNC: 9.1 MG/DL (ref 8.5–10.1)
CHLORIDE SERPL-SCNC: 104 MMOL/L (ref 98–112)
CLARITY UR REFRACT.AUTO: CLEAR
CO2 SERPL-SCNC: 29 MMOL/L (ref 21–32)
COLOR UR AUTO: YELLOW
CREAT BLD-MCNC: 0.9 MG/DL
DEPRECATED RDW RBC AUTO: 45.2 FL (ref 35.1–46.3)
EOSINOPHIL # BLD AUTO: 0.1 X10(3) UL (ref 0–0.7)
EOSINOPHIL NFR BLD AUTO: 1.5 %
ERYTHROCYTE [DISTWIDTH] IN BLOOD BY AUTOMATED COUNT: 13.2 % (ref 11–15)
GLOBULIN PLAS-MCNC: 3.6 G/DL (ref 2.8–4.4)
GLUCOSE BLD-MCNC: 116 MG/DL (ref 70–99)
GLUCOSE UR STRIP.AUTO-MCNC: NEGATIVE MG/DL
HCT VFR BLD AUTO: 41.8 %
HGB BLD-MCNC: 13.4 G/DL
IMM GRANULOCYTES # BLD AUTO: 0.01 X10(3) UL (ref 0–1)
IMM GRANULOCYTES NFR BLD: 0.2 %
KETONES UR STRIP.AUTO-MCNC: NEGATIVE MG/DL
LIPASE SERPL-CCNC: 158 U/L (ref 73–393)
LYMPHOCYTES # BLD AUTO: 2.35 X10(3) UL (ref 1–4)
LYMPHOCYTES NFR BLD AUTO: 36.3 %
M PROTEIN MFR SERPL ELPH: 7.7 G/DL (ref 6.4–8.2)
MCH RBC QN AUTO: 29.9 PG (ref 26–34)
MCHC RBC AUTO-ENTMCNC: 32.1 G/DL (ref 31–37)
MCV RBC AUTO: 93.3 FL
MONOCYTES # BLD AUTO: 0.56 X10(3) UL (ref 0.1–1)
MONOCYTES NFR BLD AUTO: 8.7 %
NEUTROPHILS # BLD AUTO: 3.41 X10 (3) UL (ref 1.5–7.7)
NEUTROPHILS # BLD AUTO: 3.41 X10(3) UL (ref 1.5–7.7)
NEUTROPHILS NFR BLD AUTO: 52.7 %
NITRITE UR QL STRIP.AUTO: NEGATIVE
OSMOLALITY SERPL CALC.SUM OF ELEC: 292 MOSM/KG (ref 275–295)
PH UR STRIP.AUTO: 7.5 [PH] (ref 4.5–8)
PLATELET # BLD AUTO: 240 10(3)UL (ref 150–450)
POTASSIUM SERPL-SCNC: 3.9 MMOL/L (ref 3.5–5.1)
PROT UR STRIP.AUTO-MCNC: NEGATIVE MG/DL
RBC # BLD AUTO: 4.48 X10(6)UL
SARS-COV-2 RNA RESP QL NAA+PROBE: NOT DETECTED
SODIUM SERPL-SCNC: 139 MMOL/L (ref 136–145)
SP GR UR STRIP.AUTO: 1.02 (ref 1–1.03)
UROBILINOGEN UR STRIP.AUTO-MCNC: 0.2 MG/DL
WBC # BLD AUTO: 6.5 X10(3) UL (ref 4–11)

## 2021-01-18 PROCEDURE — 99223 1ST HOSP IP/OBS HIGH 75: CPT | Performed by: INTERNAL MEDICINE

## 2021-01-18 PROCEDURE — 74177 CT ABD & PELVIS W/CONTRAST: CPT | Performed by: EMERGENCY MEDICINE

## 2021-01-18 RX ORDER — SODIUM CHLORIDE 9 MG/ML
INJECTION, SOLUTION INTRAVENOUS CONTINUOUS
Status: CANCELLED | OUTPATIENT
Start: 2021-01-18 | End: 2021-01-18

## 2021-01-18 RX ORDER — SODIUM CHLORIDE 9 MG/ML
INJECTION, SOLUTION INTRAVENOUS CONTINUOUS
Status: DISCONTINUED | OUTPATIENT
Start: 2021-01-18 | End: 2021-01-21

## 2021-01-18 RX ORDER — ONDANSETRON 2 MG/ML
4 INJECTION INTRAMUSCULAR; INTRAVENOUS EVERY 4 HOURS PRN
Status: CANCELLED | OUTPATIENT
Start: 2021-01-18

## 2021-01-18 RX ORDER — HYDROMORPHONE HYDROCHLORIDE 1 MG/ML
0.5 INJECTION, SOLUTION INTRAMUSCULAR; INTRAVENOUS; SUBCUTANEOUS EVERY 30 MIN PRN
Status: CANCELLED | OUTPATIENT
Start: 2021-01-18 | End: 2021-01-18

## 2021-01-18 RX ORDER — KETOROLAC TROMETHAMINE 15 MG/ML
15 INJECTION, SOLUTION INTRAMUSCULAR; INTRAVENOUS ONCE
Status: COMPLETED | OUTPATIENT
Start: 2021-01-18 | End: 2021-01-18

## 2021-01-19 ENCOUNTER — ANESTHESIA EVENT (OUTPATIENT)
Dept: ENDOSCOPY | Facility: HOSPITAL | Age: 76
DRG: 390 | End: 2021-01-19
Payer: MEDICARE

## 2021-01-19 LAB
ANION GAP SERPL CALC-SCNC: 4 MMOL/L (ref 0–18)
BASOPHILS # BLD AUTO: 0.03 X10(3) UL (ref 0–0.2)
BASOPHILS NFR BLD AUTO: 0.6 %
BUN BLD-MCNC: 14 MG/DL (ref 7–18)
BUN/CREAT SERPL: 18.4 (ref 10–20)
CALCIUM BLD-MCNC: 9 MG/DL (ref 8.5–10.1)
CHLORIDE SERPL-SCNC: 109 MMOL/L (ref 98–112)
CO2 SERPL-SCNC: 28 MMOL/L (ref 21–32)
CREAT BLD-MCNC: 0.76 MG/DL
DEPRECATED RDW RBC AUTO: 43.8 FL (ref 35.1–46.3)
EOSINOPHIL # BLD AUTO: 0.11 X10(3) UL (ref 0–0.7)
EOSINOPHIL NFR BLD AUTO: 2.2 %
ERYTHROCYTE [DISTWIDTH] IN BLOOD BY AUTOMATED COUNT: 13.1 % (ref 11–15)
GLUCOSE BLD-MCNC: 95 MG/DL (ref 70–99)
HCT VFR BLD AUTO: 42.4 %
HGB BLD-MCNC: 14 G/DL
IMM GRANULOCYTES # BLD AUTO: 0.01 X10(3) UL (ref 0–1)
IMM GRANULOCYTES NFR BLD: 0.2 %
LYMPHOCYTES # BLD AUTO: 1.48 X10(3) UL (ref 1–4)
LYMPHOCYTES NFR BLD AUTO: 30 %
MCH RBC QN AUTO: 30 PG (ref 26–34)
MCHC RBC AUTO-ENTMCNC: 33 G/DL (ref 31–37)
MCV RBC AUTO: 90.8 FL
MONOCYTES # BLD AUTO: 0.43 X10(3) UL (ref 0.1–1)
MONOCYTES NFR BLD AUTO: 8.7 %
NEUTROPHILS # BLD AUTO: 2.87 X10 (3) UL (ref 1.5–7.7)
NEUTROPHILS # BLD AUTO: 2.87 X10(3) UL (ref 1.5–7.7)
NEUTROPHILS NFR BLD AUTO: 58.3 %
OSMOLALITY SERPL CALC.SUM OF ELEC: 292 MOSM/KG (ref 275–295)
PLATELET # BLD AUTO: 214 10(3)UL (ref 150–450)
POTASSIUM SERPL-SCNC: 3.6 MMOL/L (ref 3.5–5.1)
POTASSIUM SERPL-SCNC: 3.6 MMOL/L (ref 3.5–5.1)
RBC # BLD AUTO: 4.67 X10(6)UL
SODIUM SERPL-SCNC: 141 MMOL/L (ref 136–145)
WBC # BLD AUTO: 4.9 X10(3) UL (ref 4–11)

## 2021-01-19 PROCEDURE — 99232 SBSQ HOSP IP/OBS MODERATE 35: CPT | Performed by: INTERNAL MEDICINE

## 2021-01-19 RX ORDER — LABETALOL HYDROCHLORIDE 5 MG/ML
10 INJECTION, SOLUTION INTRAVENOUS EVERY 6 HOURS PRN
Status: DISCONTINUED | OUTPATIENT
Start: 2021-01-19 | End: 2021-01-21

## 2021-01-19 RX ORDER — HYDRALAZINE HYDROCHLORIDE 20 MG/ML
10 INJECTION INTRAMUSCULAR; INTRAVENOUS EVERY 6 HOURS PRN
Status: DISCONTINUED | OUTPATIENT
Start: 2021-01-19 | End: 2021-01-21

## 2021-01-19 RX ORDER — METOCLOPRAMIDE HYDROCHLORIDE 5 MG/ML
10 INJECTION INTRAMUSCULAR; INTRAVENOUS EVERY 8 HOURS PRN
Status: DISCONTINUED | OUTPATIENT
Start: 2021-01-19 | End: 2021-01-21

## 2021-01-19 RX ORDER — POTASSIUM CHLORIDE 20 MEQ/1
40 TABLET, EXTENDED RELEASE ORAL EVERY 4 HOURS
Status: COMPLETED | OUTPATIENT
Start: 2021-01-19 | End: 2021-01-19

## 2021-01-19 RX ORDER — ACETAMINOPHEN 325 MG/1
650 TABLET ORAL EVERY 6 HOURS PRN
Status: DISCONTINUED | OUTPATIENT
Start: 2021-01-19 | End: 2021-01-21

## 2021-01-19 RX ORDER — ONDANSETRON 2 MG/ML
4 INJECTION INTRAMUSCULAR; INTRAVENOUS EVERY 6 HOURS PRN
Status: DISCONTINUED | OUTPATIENT
Start: 2021-01-19 | End: 2021-01-21

## 2021-01-19 RX ORDER — ENOXAPARIN SODIUM 100 MG/ML
40 INJECTION SUBCUTANEOUS DAILY
Status: DISCONTINUED | OUTPATIENT
Start: 2021-01-19 | End: 2021-01-21

## 2021-01-19 RX ORDER — DEXTROSE AND SODIUM CHLORIDE 5; .45 G/100ML; G/100ML
INJECTION, SOLUTION INTRAVENOUS CONTINUOUS
Status: DISCONTINUED | OUTPATIENT
Start: 2021-01-19 | End: 2021-01-21

## 2021-01-19 NOTE — H&P
JAMIR HOSPITALIST  History and Physical     Yadiel Fazal Zarco Patient Status:  Emergency    1945 MRN [id number]   Location [address] [facility] Attending No att. providers found   Hosp Day # 0 PCP Fredi Khan MD     [facility] Allergies: No Known Allergies    Medications:  No current facility-administered medications on file prior to encounter.      •  Pravastatin Sodium 40 MG Oral Tab, TAKE 1 TABLET BY MOUTH ONCE DAILY AT BEDTIME (Patient not taking: Reported on 1/18/2021 ), results for input(s): TROP, CK in the last 168 hours. Imaging: Imaging data reviewed in Epic. ASSESSMENT / PLAN:     1. Acute on chronic? SBO with 2/2 stricture? Mass? 1. NPO  2. IVF  3. GI consulted   4. Analgesia/Antiemetics  2.  HTN - pain respo

## 2021-01-19 NOTE — PAYOR COMM NOTE
Appropriate for inpatient status per guidelines for flank pain due to SBO, possible stricture vs mass.        --------------  ADMISSION REVIEW     Payor: [address] [place] [address] #:  [id number]  Authorization Number: [id number]       ED Provide (36.9 °C)   Temp src    SpO2 97 %   O2 Device None (Room air)       Current:BP (!) 171/67   Pulse 56   Temp 98.5 °F (36.9 °C)   Resp 16   Ht 162.6 cm (5' 4\")   Wt 63.5 kg   SpO2 98%   BMI 24.03 kg/m²         Physical Exam    General:  Vitals as listed.   Trudi Guzman ORAL)(CPT=74176), 1/09/2017, 8:13 PM.  INDICATIONS:  RIGHT SIDED BACK PAIN RADIATING TO THE FRONT FOR ABOUT 2 DAYS. NAUSEA.   TECHNIQUE:  CT scanning was performed from the dome of the diaphragm to the pubic symphysis with non-ionic intravenous contrast mat BLADDER:  No visible focal wall thickening, lesion, or calculus. PELVIC NODES:  No adenopathy. PELVIC ORGANS:  No visible mass. Pelvic organs appropriate for patient age. BONES:  No bony lesion or fracture.   LUNG BASES:  No visible pulmonary or pleural Normocephalic atraumatic. Moist mucous membranes. EOM-I. PERRLA. Anicteric. Neck: No lymphadenopathy. Respiratory: Clear to auscultation bilaterally. No wheezes. No rhonchi. Cardiovascular: S1, S2. Regular rate and rhythm. No murmurs, rubs or gallops. under MAC with Dr Tiffanie Sheth pt is able to complete bowel prep  2. Clear liquid diet today; NPO after midnight  3. Golytely 4000 ml starting now, pt encouraged to drink smaller more frequent amounts throughout the day  4.  Pain management per PCP recommendati

## 2021-01-19 NOTE — PROGRESS NOTES
JAMIR HOSPITALIST  Progress Note     Ruddy Number Haroldo Patient Status:  Inpatient    1945 MRN [id number]   [facility] 3NW-A Attending Lawanda Aragon MD   Hosp Day # 1 PCP Josh Wright MD     Chief Complaint: abd pain    S: Patient • pantoprazole (PROTONIX) IV push  40 mg Intravenous Q24H   • enoxaparin  40 mg Subcutaneous Daily       ASSESSMENT / PLAN:     1. SBO with 2/2 stricture vs Mass  1. NPO  2. IVF  3. GI consulted - plan for Cscope   4. Analgesia/Antiemetics  2.  HTN - 2/2

## 2021-01-19 NOTE — CONSULTS
[facility]                       Gastroenterology [address] [facility] Gastroenterology    Diana Savanah Haroldo Patient Status:  Inpatient    1945 MRN [id number]   [facility] 3NW-A Attending Chun Santiago MD   Hosp Day # 1 PCP Mos PLACEMENT Right 3/6/2017    Performed by Briseyda Iglesias MD at [address] [place] [address]   • HOLMIUM LASER LITHOTRIPSY N/A 3/6/2017    Performed by Briseyda Iglesias MD at [address] [place] [address]   • HYSTERECTOMY     • OOPHORECTOMY     • TOTAL ABDOM HYSTERECTOMY       Medications: illness            Cardiovascular: + dyslipidemia             Respiratory: No shortness of breath, asthma, copd, recurrent pneumonia            Hematologic: The patient reports no easy bruising, frequent gum bleeding or nose bleeding;   The patient has no h 214.0 01/19/2021    CREATSERUM 0.76 01/19/2021    BUN 14 01/19/2021     01/19/2021    K 3.6 01/19/2021     01/19/2021    CO2 28.0 01/19/2021    GLU 95 01/19/2021    CA 9.0 01/19/2021    ALB 4.1 01/18/2021    ALKPHO 92 01/18/2021    BILT 0.5 01/ KIDNEYS:  No mass, obstruction, or calcification. ADRENALS:  No mass or enlargement. AORTA/VASCULAR:  No aneurysm or dissection. RETROPERITONEUM:  No mass or adenopathy.     BOWEL/MESENTERY:  In the right lower quadrant there is a transition betw suggests a bowel stricture vs masses involving the distal ileum; labs unremarkable.  Since pain has improved without narcotics and no nausea, will attempt to start bowel prep with plan for colonoscopy in AM to assess for IBD, benign stricture, neoplasm    R patient or the patient's surrogate.  The discussion of risks, not limited to but including bleeding, infection, perforation, adverse effects from anesthesia, and possible prolonged hospitalization/emergency surgery, and risk of missed lesion(s) were discuss

## 2021-01-19 NOTE — PROGRESS NOTES
NURSING ADMISSION NOTE      Patient admitted via Wheelchair  Oriented to room. 317  Safety precautions initiated. Bed in low position. Call light in reach. Admission navigator completed. PTA meds reviewed. MD paged for admission orders.

## 2021-01-19 NOTE — ED PROVIDER NOTES
Patient Seen in: [facility] Emergency Department In [facility] [address] [place] [address]      History   Patient presents with:  Abdomen/Flank Pain    Stated Complaint: RIGHT SIDED BACK PAIN RADIATING TO THE FRONT FOR ABOUT 2 DAYS.  NAUSEA.    HPI/Subjective:   HPI    39-year-old woman SpO2 97 %   O2 Device None (Room air)       Current:BP (!) 171/67   Pulse 56   Temp 98.5 °F (36.9 °C)   Resp 16   Ht 162.6 cm (5' 4\")   Wt 63.5 kg   SpO2 98%   BMI 24.03 kg/m²         Physical Exam    General:  Vitals as listed.   No acute distress   KEILY 1/09/2017, 8:13 PM.  INDICATIONS:  RIGHT SIDED BACK PAIN RADIATING TO THE FRONT FOR ABOUT 2 DAYS. NAUSEA.   TECHNIQUE:  CT scanning was performed from the dome of the diaphragm to the pubic symphysis with non-ionic intravenous contrast material. Post contra visible focal wall thickening, lesion, or calculus. PELVIC NODES:  No adenopathy. PELVIC ORGANS:  No visible mass. Pelvic organs appropriate for patient age. BONES:  No bony lesion or fracture. LUNG BASES:  No visible pulmonary or pleural disease.   OT

## 2021-01-19 NOTE — ANESTHESIA PREPROCEDURE EVALUATION
PRE-OP EVALUATION    Patient Name: Ayaka Poe    Pre-op Diagnosis: RLQ abdominal pain [R10.31]  Abnormal CT scan, gastrointestinal tract [R93.3]    Procedure(s):  COLONOSCOPY    Surgeon(s) and Role:     Kisha Pratt MD - Primary    Pre-op vitals •  vitamin E 400 UNITS Oral Cap, Take 1,000 Units by mouth., Disp: , Rfl: , 1/17/2021 at Unknown time    •  omeprazole 20 MG Oral Capsule Delayed Release, Take 1 capsule by mouth as needed. , Disp: , Rfl: 0        Allergies: Patient has no known allergies. Alcohol/week: 1.0 standard drinks      Types: 1 Standard drinks or equivalent per week      Drug use: No     Available pre-op labs reviewed.   Lab Results   Component Value Date    WBC 4.9 01/19/2021    RBC 4.67 01/19/2021    HGB 14.0 01/19/2021    HCT

## 2021-01-19 NOTE — ED INITIAL ASSESSMENT (HPI)
Pt c/o right side flank pain that started 2 weeks ago. Pt states pain was minimal when pain started but got progressively worse over the past several days. Denies N/V/D.  Denies urinary symptoms, denies fever

## 2021-01-19 NOTE — PLAN OF CARE
A/O x4. Vital signs stable. BP running high. IV hydralazine given. Tolerating RA with saturations of >92%. Tele-NSR/SB. Voiding freely. Complaints of pain to the right abdomen. Pt refusing pain medication. Up self. Safety precautions in place.  Pt updated o

## 2021-01-19 NOTE — PLAN OF CARE
Assumed care of pt at 0730  A&Ox4, up ad michelle with a steady gait, no complaints of pain  R/A, NSR, no chest pain, no shortness of breath  Pt endorses some abdominal discomfort but states that it is tolerable  GoLytely prep started at 930 per orders, educate

## 2021-01-20 ENCOUNTER — ANESTHESIA (OUTPATIENT)
Dept: ENDOSCOPY | Facility: HOSPITAL | Age: 76
DRG: 390 | End: 2021-01-20
Payer: MEDICARE

## 2021-01-20 ENCOUNTER — APPOINTMENT (OUTPATIENT)
Dept: MRI IMAGING | Facility: HOSPITAL | Age: 76
DRG: 390 | End: 2021-01-20
Attending: NURSE PRACTITIONER
Payer: MEDICARE

## 2021-01-20 LAB
ANION GAP SERPL CALC-SCNC: 2 MMOL/L (ref 0–18)
BASOPHILS # BLD AUTO: 0.04 X10(3) UL (ref 0–0.2)
BASOPHILS NFR BLD AUTO: 0.8 %
BUN BLD-MCNC: 7 MG/DL (ref 7–18)
BUN/CREAT SERPL: 9.5 (ref 10–20)
CALCIUM BLD-MCNC: 9 MG/DL (ref 8.5–10.1)
CHLORIDE SERPL-SCNC: 108 MMOL/L (ref 98–112)
CO2 SERPL-SCNC: 30 MMOL/L (ref 21–32)
CREAT BLD-MCNC: 0.74 MG/DL
DEPRECATED RDW RBC AUTO: 45.5 FL (ref 35.1–46.3)
EOSINOPHIL # BLD AUTO: 0.15 X10(3) UL (ref 0–0.7)
EOSINOPHIL NFR BLD AUTO: 3 %
ERYTHROCYTE [DISTWIDTH] IN BLOOD BY AUTOMATED COUNT: 13.1 % (ref 11–15)
GLUCOSE BLD-MCNC: 99 MG/DL (ref 70–99)
HAV IGM SER QL: 2.2 MG/DL (ref 1.6–2.6)
HCT VFR BLD AUTO: 41.8 %
HGB BLD-MCNC: 13.3 G/DL
IMM GRANULOCYTES # BLD AUTO: 0.01 X10(3) UL (ref 0–1)
IMM GRANULOCYTES NFR BLD: 0.2 %
INR BLD: 1.01 (ref 0.89–1.11)
LYMPHOCYTES # BLD AUTO: 1.78 X10(3) UL (ref 1–4)
LYMPHOCYTES NFR BLD AUTO: 35.5 %
MCH RBC QN AUTO: 30.1 PG (ref 26–34)
MCHC RBC AUTO-ENTMCNC: 31.8 G/DL (ref 31–37)
MCV RBC AUTO: 94.6 FL
MONOCYTES # BLD AUTO: 0.47 X10(3) UL (ref 0.1–1)
MONOCYTES NFR BLD AUTO: 9.4 %
NEUTROPHILS # BLD AUTO: 2.56 X10 (3) UL (ref 1.5–7.7)
NEUTROPHILS # BLD AUTO: 2.56 X10(3) UL (ref 1.5–7.7)
NEUTROPHILS NFR BLD AUTO: 51.1 %
OSMOLALITY SERPL CALC.SUM OF ELEC: 288 MOSM/KG (ref 275–295)
PLATELET # BLD AUTO: 229 10(3)UL (ref 150–450)
POTASSIUM SERPL-SCNC: 4.1 MMOL/L (ref 3.5–5.1)
PSA SERPL DL<=0.01 NG/ML-MCNC: 13.6 SECONDS (ref 12.4–14.6)
RBC # BLD AUTO: 4.42 X10(6)UL
SODIUM SERPL-SCNC: 140 MMOL/L (ref 136–145)
WBC # BLD AUTO: 5 X10(3) UL (ref 4–11)

## 2021-01-20 PROCEDURE — 0DJD8ZZ INSPECTION OF LOWER INTESTINAL TRACT, VIA NATURAL OR ARTIFICIAL OPENING ENDOSCOPIC: ICD-10-PCS | Performed by: STUDENT IN AN ORGANIZED HEALTH CARE EDUCATION/TRAINING PROGRAM

## 2021-01-20 PROCEDURE — 74183 MRI ABD W/O CNTR FLWD CNTR: CPT | Performed by: NURSE PRACTITIONER

## 2021-01-20 PROCEDURE — 72197 MRI PELVIS W/O & W/DYE: CPT | Performed by: NURSE PRACTITIONER

## 2021-01-20 PROCEDURE — 99232 SBSQ HOSP IP/OBS MODERATE 35: CPT | Performed by: HOSPITALIST

## 2021-01-20 RX ORDER — NALOXONE HYDROCHLORIDE 0.4 MG/ML
80 INJECTION, SOLUTION INTRAMUSCULAR; INTRAVENOUS; SUBCUTANEOUS AS NEEDED
Status: DISCONTINUED | OUTPATIENT
Start: 2021-01-20 | End: 2021-01-20 | Stop reason: HOSPADM

## 2021-01-20 RX ORDER — SODIUM CHLORIDE, SODIUM LACTATE, POTASSIUM CHLORIDE, CALCIUM CHLORIDE 600; 310; 30; 20 MG/100ML; MG/100ML; MG/100ML; MG/100ML
INJECTION, SOLUTION INTRAVENOUS CONTINUOUS
Status: DISCONTINUED | OUTPATIENT
Start: 2021-01-20 | End: 2021-01-21

## 2021-01-20 RX ORDER — HYDROMORPHONE HYDROCHLORIDE 1 MG/ML
0.4 INJECTION, SOLUTION INTRAMUSCULAR; INTRAVENOUS; SUBCUTANEOUS EVERY 5 MIN PRN
Status: DISCONTINUED | OUTPATIENT
Start: 2021-01-20 | End: 2021-01-20 | Stop reason: HOSPADM

## 2021-01-20 RX ORDER — LIDOCAINE HYDROCHLORIDE 10 MG/ML
INJECTION, SOLUTION EPIDURAL; INFILTRATION; INTRACAUDAL; PERINEURAL AS NEEDED
Status: DISCONTINUED | OUTPATIENT
Start: 2021-01-20 | End: 2021-01-20 | Stop reason: SURG

## 2021-01-20 RX ADMIN — SODIUM CHLORIDE: 9 INJECTION, SOLUTION INTRAVENOUS at 12:06:00

## 2021-01-20 RX ADMIN — LIDOCAINE HYDROCHLORIDE 50 MG: 10 INJECTION, SOLUTION EPIDURAL; INFILTRATION; INTRACAUDAL; PERINEURAL at 11:36:00

## 2021-01-20 NOTE — OPERATIVE REPORT
Colonoscopy Operative Report  Patient Name: Isiah Fischer  YOB: 1945  MRN: [id number]  Procedure: Colonoscopy   Pre-operative Diagnosis & Indication: CT abnormality, small bowel lesion vs. stricture  Post-operative Diagnosis: Diverticula tip of the  colonoscope was then introduced into the rectum and advanced to the terminal ileum. The appendiceal orifice and ileocecal valve were clearly and distinctly visualized, thus verifying the cecum. The terminal ileum was intubated.   The endoscop further evaluate CT abnormality   - OK to advance diet are MRE completed; IVF until then to hydrate    GI will continue to follow. Please page with any questions.      Margarita Jimenez  1/20/2021  12:07 PM

## 2021-01-20 NOTE — PLAN OF CARE
Pt alert and orientedx4. Room air. Pulse Ox. Mostly Slovenian speaking but pt is able to communicate with staff effectively in [facility]. Elevated blood pressure, hydralazine PRN given. IVF infusing. Golytely prep finished. NPO @ midnight.  Having loose bowel m

## 2021-01-20 NOTE — PLAN OF CARE
Pt A&Ox4, Nigerian speaking but does understand some english able to communicate and provide care. Pt had partial upper dentures. . NSR on tele. VSS. IVF. NPO continued until MRI with contrast tonight. No c/o of n/v/d.  Pt had no pain or discomfort at [facility]

## 2021-01-20 NOTE — ANESTHESIA POSTPROCEDURE EVALUATION
[address] Patient Status:  Inpatient   Age/Gender 66year old female MRN [id number]   Location [address] [facility]. Attending Chrystal Oppenheim, MD   [facility] Day # 2 PCP Elena Alba MD       Anesthesia Post-op Note    Procedur

## 2021-01-20 NOTE — PROGRESS NOTES
JAMIR HOSPITALIST  Progress Note     Ania Zarco Patient Status:  Inpatient    1945 MRN [id number]   [facility] 3NW-A Attending Jose G Domínguez MD   Hosp Day # 2 PCP John Toledo MD     Chief Complaint: abd pain    S:  Feels results for input(s): PTP, INR in the last 168 hours. No results for input(s): TROP, CK in the last 168 hours. Imaging: Imaging data reviewed in Epic.     Medications:   • pantoprazole (PROTONIX) IV push  40 mg Intravenous Q24H   • enoxaparin  40

## 2021-01-21 ENCOUNTER — PATIENT OUTREACH (OUTPATIENT)
Dept: CASE MANAGEMENT | Age: 76
End: 2021-01-21

## 2021-01-21 VITALS
OXYGEN SATURATION: 94 % | RESPIRATION RATE: 16 BRPM | DIASTOLIC BLOOD PRESSURE: 80 MMHG | HEIGHT: 64 IN | HEART RATE: 60 BPM | SYSTOLIC BLOOD PRESSURE: 144 MMHG | WEIGHT: 140 LBS | TEMPERATURE: 98 F | BODY MASS INDEX: 23.9 KG/M2

## 2021-01-21 PROCEDURE — 99239 HOSP IP/OBS DSCHRG MGMT >30: CPT | Performed by: HOSPITALIST

## 2021-01-21 RX ORDER — PANTOPRAZOLE SODIUM 40 MG/1
40 TABLET, DELAYED RELEASE ORAL DAILY
Qty: 30 TABLET | Refills: 0 | Status: SHIPPED | OUTPATIENT
Start: 2021-01-21 | End: 2022-01-12

## 2021-01-21 NOTE — PROGRESS NOTES
1st attempt TCC apt request    [address] [place] [address] [phone]    Unable to contact patients spouse Brandan Rashid.  LVM for CB

## 2021-01-21 NOTE — PLAN OF CARE
Problem: Patient/Family Goals  Goal: Patient/Family Long Term Goal  Description: Patient's Long Term Goal: Return home    Interventions:  - GI consult  - See additional Care Plan goals for specific interventions  Outcome: Progressing  Goal: Patient/Famil

## 2021-01-21 NOTE — PROGRESS NOTES
The patient tolerated a soft diet meal for lunch, no complaint of nausea or pain after eating. Patient discharged to home.  Discharge instructions reviewed with the patient and her , and they both verbalized their understanding of the discharge instr

## 2021-01-21 NOTE — PROGRESS NOTES
JAMIR HOSPITALIST  Progress Note     Darin Pulse Haroldo Patient Status:  Inpatient    1945 MRN [id number]   [facility] 3NW-A Attending Lilian Ramirez MD   Hosp Day # 3 PCP Cheyenne Contreras MD     Chief Complaint: abd pain    S:  Feels on SCr of 0.74 mg/dL). Recent Labs   Lab 01/20/21  1017   PTP 13.6   INR 1.01       No results for input(s): TROP, CK in the last 168 hours. Imaging: Imaging data reviewed in Epic.     Medications:   • pantoprazole (PROTONIX) IV push  40 mg Intra

## 2021-01-22 ENCOUNTER — PATIENT OUTREACH (OUTPATIENT)
Dept: CASE MANAGEMENT | Age: 76
End: 2021-01-22

## 2021-01-22 DIAGNOSIS — Z02.9 ENCOUNTERS FOR UNSPECIFIED ADMINISTRATIVE PURPOSE: ICD-10-CM

## 2021-01-22 NOTE — PAYOR COMM NOTE
--------------  DISCHARGE REVIEW    Payor: [facility] [place] #:  [id number]  Authorization Number: [id number]    Admit date: 1/18/21  Admit time:  2339  Discharge Date: 1/21/2021  3:05 PM     Admitting Physician: Karie Mercedes MD  Atte thereafter she developed severe and constant right-sided abdominal pain that radiated into her upper back on the right side. She endorses that she has been passing gas. She endorses nausea without emesis. No fever. No chest pain. No recent illness. [address] [facility] [address]  [phone]    Schedule an appointment as soon as possible for a visit in 6 weeks  For follow up evaluation.       -----------------------------------------------------------------------------------------------  PATIENT DISCHARGE INSTRUCTIONS: S

## 2021-01-22 NOTE — DISCHARGE SUMMARY
JAMIR HOSPITALIST  DISCHARGE SUMMARY     Doc Kehinde Zarco Patient Status:  Inpatient    1945 MRN [id number]   [facility] 3NW-A Attending No att. providers found   Hosp Day # 3 PCP Alexa Garcia MD     Date of Admission: 2021 discharged in good condition.     Procedures during hospitalization:   • Colonoscopy    Incidental or significant findings and recommendations (brief descriptions):  • Per Brief Synopsis of Hospital Course    Lab/Test results pending at Discharge:   · None

## 2021-01-25 NOTE — PROGRESS NOTES
NCM placed call to patient for HFU, LM requesting a call to [phone] back with a condition update. [facility] also requested patient please call the [facility] at [phone] to schedule a hospital follow up appointment.

## 2021-01-28 ENCOUNTER — OFFICE VISIT (OUTPATIENT)
Dept: FAMILY MEDICINE CLINIC | Facility: CLINIC | Age: 76
End: 2021-01-28
Payer: COMMERCIAL

## 2021-01-28 ENCOUNTER — TELEPHONE (OUTPATIENT)
Dept: FAMILY MEDICINE CLINIC | Facility: CLINIC | Age: 76
End: 2021-01-28

## 2021-01-28 ENCOUNTER — HOSPITAL ENCOUNTER (EMERGENCY)
Age: 76
Discharge: STILL ADMITTED OR ADMINISTRATIVE | End: 2021-01-28
Payer: MEDICARE

## 2021-01-28 ENCOUNTER — HOSPITAL ENCOUNTER (OUTPATIENT)
Dept: GENERAL RADIOLOGY | Age: 76
Discharge: HOME | End: 2021-01-28
Attending: FAMILY MEDICINE
Payer: MEDICARE

## 2021-01-28 VITALS
HEART RATE: 74 BPM | RESPIRATION RATE: 16 BRPM | OXYGEN SATURATION: 99 % | HEIGHT: 64 IN | WEIGHT: 140 LBS | BODY MASS INDEX: 23.9 KG/M2 | DIASTOLIC BLOOD PRESSURE: 88 MMHG | SYSTOLIC BLOOD PRESSURE: 152 MMHG | TEMPERATURE: 98 F

## 2021-01-28 DIAGNOSIS — R10.11 RUQ PAIN: ICD-10-CM

## 2021-01-28 DIAGNOSIS — E78.00 PURE HYPERCHOLESTEROLEMIA: ICD-10-CM

## 2021-01-28 DIAGNOSIS — M25.511 ACUTE PAIN OF RIGHT SHOULDER: Primary | ICD-10-CM

## 2021-01-28 PROCEDURE — 90662 IIV NO PRSV INCREASED AG IM: CPT | Performed by: FAMILY MEDICINE

## 2021-01-28 PROCEDURE — G0008 ADMIN INFLUENZA VIRUS VAC: HCPCS | Performed by: FAMILY MEDICINE

## 2021-01-28 PROCEDURE — 3079F DIAST BP 80-89 MM HG: CPT | Performed by: FAMILY MEDICINE

## 2021-01-28 PROCEDURE — 3077F SYST BP >= 140 MM HG: CPT | Performed by: FAMILY MEDICINE

## 2021-01-28 PROCEDURE — 74018 RADEX ABDOMEN 1 VIEW: CPT | Performed by: FAMILY MEDICINE

## 2021-01-28 PROCEDURE — 1111F DSCHRG MED/CURRENT MED MERGE: CPT | Performed by: FAMILY MEDICINE

## 2021-01-28 PROCEDURE — 99215 OFFICE O/P EST HI 40 MIN: CPT | Performed by: FAMILY MEDICINE

## 2021-01-28 PROCEDURE — 3008F BODY MASS INDEX DOCD: CPT | Performed by: FAMILY MEDICINE

## 2021-01-28 NOTE — TELEPHONE ENCOUNTER
Pt is no longer driving so they need to change to PCP closer to their home.  Dr. Mikey Hayward and [facility] 20

## 2021-01-28 NOTE — PROGRESS NOTES
Multiple attempts made to reach the patient for hospital follow up, with no response or return call. Patient completed HFU on 1-28-21. [facility] closing encounter.

## 2021-01-28 NOTE — PATIENT INSTRUCTIONS
Thank you for choosing Sandra Antoine MD at [place] [address]  To Do: Kylah Zarco  1. Please take meds as directed. Ladarius Guzmán is located in [address]. Monday, Tuesday & Friday – 8 a.m. to 4 p.m.   Wednesday, Thursday – 7 a.m. to 3 outweigh those potential risks and we strive to make you healthier and to improve your quality of life.     Referrals, and Radiology Information:    If your insurance requires a referral to a specialist, please allow 5 business days to process your referral

## 2021-01-28 NOTE — PROGRESS NOTES
HPI:    Patient ID: Tanika Pena is a 66year old female. HPI  Mrs. Hossein Estrada is 67 y/o F with history of hypertension, hyperlipidemia, MVP, GERD, arthritis recently admitted at [facility] for partial small bowel obstruction.   She had MRI of Oropharynx is clear and moist. No oropharyngeal exudate. Eyes: Conjunctivae are normal. No scleral icterus. Neck: Neck supple. No thyromegaly present. Cardiovascular: Normal rate, regular rhythm and normal heart sounds. No murmur heard.   Pulmonary/ VACC HIGH DOSE PRSV FREE  XR ABDOMEN (1 VIEW) (CPT=74018)       DI#[id number]

## 2021-02-12 ENCOUNTER — LAB ENCOUNTER (OUTPATIENT)
Dept: LAB | Age: 76
End: 2021-02-12
Attending: FAMILY MEDICINE
Payer: MEDICARE

## 2021-02-12 LAB
ALBUMIN SERPL-MCNC: 3.9 G/DL (ref 3.4–5)
ALBUMIN/GLOB SERPL: 1.1 {RATIO} (ref 1–2)
ALP LIVER SERPL-CCNC: 90 U/L
ALT SERPL-CCNC: 21 U/L
ANION GAP SERPL CALC-SCNC: 4 MMOL/L (ref 0–18)
AST SERPL-CCNC: 17 U/L (ref 15–37)
BASOPHILS # BLD AUTO: 0.04 X10(3) UL (ref 0–0.2)
BASOPHILS NFR BLD AUTO: 0.8 %
BILIRUB SERPL-MCNC: 0.4 MG/DL (ref 0.1–2)
BUN BLD-MCNC: 17 MG/DL (ref 7–18)
BUN/CREAT SERPL: 23.3 (ref 10–20)
CALCIUM BLD-MCNC: 9.4 MG/DL (ref 8.5–10.1)
CHLORIDE SERPL-SCNC: 106 MMOL/L (ref 98–112)
CHOLEST SMN-MCNC: 190 MG/DL (ref ?–200)
CO2 SERPL-SCNC: 31 MMOL/L (ref 21–32)
CREAT BLD-MCNC: 0.73 MG/DL
DEPRECATED RDW RBC AUTO: 46.3 FL (ref 35.1–46.3)
EOSINOPHIL # BLD AUTO: 0.17 X10(3) UL (ref 0–0.7)
EOSINOPHIL NFR BLD AUTO: 3.2 %
ERYTHROCYTE [DISTWIDTH] IN BLOOD BY AUTOMATED COUNT: 13.1 % (ref 11–15)
EST. AVERAGE GLUCOSE BLD GHB EST-MCNC: 137 MG/DL (ref 68–126)
GLOBULIN PLAS-MCNC: 3.5 G/DL (ref 2.8–4.4)
GLUCOSE BLD-MCNC: 91 MG/DL (ref 70–99)
HBA1C MFR BLD HPLC: 6.4 % (ref ?–5.7)
HCT VFR BLD AUTO: 43.3 %
HDLC SERPL-MCNC: 71 MG/DL (ref 40–59)
HGB BLD-MCNC: 13.4 G/DL
IMM GRANULOCYTES # BLD AUTO: 0.01 X10(3) UL (ref 0–1)
IMM GRANULOCYTES NFR BLD: 0.2 %
LDLC SERPL CALC-MCNC: 96 MG/DL (ref ?–100)
LYMPHOCYTES # BLD AUTO: 1.65 X10(3) UL (ref 1–4)
LYMPHOCYTES NFR BLD AUTO: 31.5 %
M PROTEIN MFR SERPL ELPH: 7.4 G/DL (ref 6.4–8.2)
MCH RBC QN AUTO: 29.8 PG (ref 26–34)
MCHC RBC AUTO-ENTMCNC: 30.9 G/DL (ref 31–37)
MCV RBC AUTO: 96.4 FL
MONOCYTES # BLD AUTO: 0.46 X10(3) UL (ref 0.1–1)
MONOCYTES NFR BLD AUTO: 8.8 %
NEUTROPHILS # BLD AUTO: 2.91 X10 (3) UL (ref 1.5–7.7)
NEUTROPHILS # BLD AUTO: 2.91 X10(3) UL (ref 1.5–7.7)
NEUTROPHILS NFR BLD AUTO: 55.5 %
NONHDLC SERPL-MCNC: 119 MG/DL (ref ?–130)
OSMOLALITY SERPL CALC.SUM OF ELEC: 293 MOSM/KG (ref 275–295)
PATIENT FASTING Y/N/NP: YES
PATIENT FASTING Y/N/NP: YES
PLATELET # BLD AUTO: 262 10(3)UL (ref 150–450)
POTASSIUM SERPL-SCNC: 4.4 MMOL/L (ref 3.5–5.1)
RBC # BLD AUTO: 4.49 X10(6)UL
SODIUM SERPL-SCNC: 141 MMOL/L (ref 136–145)
TRIGL SERPL-MCNC: 114 MG/DL (ref 30–149)
TSI SER-ACNC: 1.29 MIU/ML (ref 0.36–3.74)
VLDLC SERPL CALC-MCNC: 23 MG/DL (ref 0–30)
WBC # BLD AUTO: 5.2 X10(3) UL (ref 4–11)

## 2021-02-12 PROCEDURE — 83036 HEMOGLOBIN GLYCOSYLATED A1C: CPT | Performed by: FAMILY MEDICINE

## 2021-02-12 PROCEDURE — 84443 ASSAY THYROID STIM HORMONE: CPT | Performed by: FAMILY MEDICINE

## 2021-02-12 PROCEDURE — 80053 COMPREHEN METABOLIC PANEL: CPT | Performed by: FAMILY MEDICINE

## 2021-02-12 PROCEDURE — 36415 COLL VENOUS BLD VENIPUNCTURE: CPT | Performed by: FAMILY MEDICINE

## 2021-02-12 PROCEDURE — 85025 COMPLETE CBC W/AUTO DIFF WBC: CPT | Performed by: FAMILY MEDICINE

## 2021-02-12 PROCEDURE — 80061 LIPID PANEL: CPT | Performed by: FAMILY MEDICINE

## 2021-02-18 ENCOUNTER — OFFICE VISIT (OUTPATIENT)
Dept: FAMILY MEDICINE CLINIC | Facility: CLINIC | Age: 76
End: 2021-02-18
Payer: COMMERCIAL

## 2021-02-18 VITALS
TEMPERATURE: 98 F | RESPIRATION RATE: 14 BRPM | HEART RATE: 62 BPM | OXYGEN SATURATION: 97 % | SYSTOLIC BLOOD PRESSURE: 120 MMHG | HEIGHT: 64 IN | DIASTOLIC BLOOD PRESSURE: 70 MMHG | WEIGHT: 138 LBS | BODY MASS INDEX: 23.56 KG/M2

## 2021-02-18 DIAGNOSIS — E78.00 PURE HYPERCHOLESTEROLEMIA: ICD-10-CM

## 2021-02-18 DIAGNOSIS — R31.0 GROSS HEMATURIA: Primary | ICD-10-CM

## 2021-02-18 LAB
GLUCOSE (URINE DIPSTICK): NEGATIVE MG/DL
LEUKOCYTES: NEGATIVE
MULTISTIX LOT#: 5077 NUMERIC
NITRITE, URINE: NEGATIVE
PH, URINE: 6.5 (ref 4.5–8)
SPECIFIC GRAVITY: 1.02 (ref 1–1.03)
UROBILINOGEN,SEMI-QN: 1 MG/DL (ref 0–1.9)

## 2021-02-18 PROCEDURE — 81003 URINALYSIS AUTO W/O SCOPE: CPT | Performed by: FAMILY MEDICINE

## 2021-02-18 PROCEDURE — 3074F SYST BP LT 130 MM HG: CPT | Performed by: FAMILY MEDICINE

## 2021-02-18 PROCEDURE — 99214 OFFICE O/P EST MOD 30 MIN: CPT | Performed by: FAMILY MEDICINE

## 2021-02-18 PROCEDURE — 3078F DIAST BP <80 MM HG: CPT | Performed by: FAMILY MEDICINE

## 2021-02-18 PROCEDURE — 3008F BODY MASS INDEX DOCD: CPT | Performed by: FAMILY MEDICINE

## 2021-02-18 RX ORDER — PRAVASTATIN SODIUM 40 MG
TABLET ORAL
Qty: 90 TABLET | Refills: 3 | Status: SHIPPED | OUTPATIENT
Start: 2021-02-18

## 2021-02-18 RX ORDER — PANTOPRAZOLE SODIUM 40 MG/1
40 TABLET, DELAYED RELEASE ORAL DAILY
Qty: 90 TABLET | Refills: 1 | Status: CANCELLED | OUTPATIENT
Start: 2021-02-18

## 2021-02-18 NOTE — PROGRESS NOTES
HPI:    Patient ID: Saranya Lawrence is a 66year old female. HPI  Ms. Marylen Raya is a pleasant 75-year-old female accompanied by her . She has history of dyslipidemia for which she takes pravastatin.   She was recently hospitalized for [facility] exudate. Eyes: Conjunctivae are normal. No scleral icterus. Neck: Neck supple. No thyromegaly present. Cardiovascular: Normal rate, regular rhythm and normal heart sounds. No murmur heard.   Pulmonary/Chest: Effort normal and breath sounds normal. N

## 2021-02-18 NOTE — PATIENT INSTRUCTIONS
Thank you for choosing James Nair MD at [place] [address]  To Do: Kylah Zarco  1. Please take meds as directed. [facility] Jalen Butt is located in [address]. Monday, Tuesday & Friday – 8 a.m. to 4 p.m.   Wednesday, Thursday – 7 a.m. to 3 outweigh those potential risks and we strive to make you healthier and to improve your quality of life.     Referrals, and Radiology Information:    If your insurance requires a referral to a specialist, please allow 5 business days to process your referral will show if there is still blood in the urine. If there is, then other tests can be done to find out the cause.    Home care  Follow these home care guidelines:  · If your urine does not look bloody (pink, brown, or red) then you don't need to restrict you

## 2021-05-25 ENCOUNTER — TELEPHONE (OUTPATIENT)
Dept: CASE MANAGEMENT | Age: 76
End: 2021-05-25

## 2021-06-11 ENCOUNTER — TELEPHONE (OUTPATIENT)
Dept: CASE MANAGEMENT | Age: 76
End: 2021-06-11

## 2021-11-19 ENCOUNTER — TELEPHONE (OUTPATIENT)
Dept: FAMILY MEDICINE CLINIC | Facility: CLINIC | Age: 76
End: 2021-11-19

## 2021-12-20 ENCOUNTER — HOSPITAL ENCOUNTER (EMERGENCY)
Age: 76
Discharge: HOME | End: 2021-12-21
Attending: EMERGENCY MEDICINE
Payer: MEDICARE

## 2021-12-20 ENCOUNTER — APPOINTMENT (OUTPATIENT)
Dept: CT IMAGING | Age: 76
End: 2021-12-20
Attending: EMERGENCY MEDICINE
Payer: MEDICARE

## 2021-12-20 DIAGNOSIS — R10.9 ABDOMINAL PAIN, ACUTE: Primary | ICD-10-CM

## 2021-12-20 DIAGNOSIS — R31.9 HEMATURIA, UNSPECIFIED TYPE: ICD-10-CM

## 2021-12-20 PROCEDURE — 36415 COLL VENOUS BLD VENIPUNCTURE: CPT

## 2021-12-20 PROCEDURE — 74177 CT ABD & PELVIS W/CONTRAST: CPT | Performed by: EMERGENCY MEDICINE

## 2021-12-20 PROCEDURE — 85025 COMPLETE CBC W/AUTO DIFF WBC: CPT | Performed by: EMERGENCY MEDICINE

## 2021-12-20 PROCEDURE — 83690 ASSAY OF LIPASE: CPT | Performed by: EMERGENCY MEDICINE

## 2021-12-20 PROCEDURE — 81001 URINALYSIS AUTO W/SCOPE: CPT | Performed by: EMERGENCY MEDICINE

## 2021-12-20 PROCEDURE — 99285 EMERGENCY DEPT VISIT HI MDM: CPT

## 2021-12-20 PROCEDURE — 99284 EMERGENCY DEPT VISIT MOD MDM: CPT

## 2021-12-20 PROCEDURE — 80053 COMPREHEN METABOLIC PANEL: CPT | Performed by: EMERGENCY MEDICINE

## 2021-12-21 VITALS
OXYGEN SATURATION: 99 % | SYSTOLIC BLOOD PRESSURE: 154 MMHG | WEIGHT: 135 LBS | TEMPERATURE: 98 F | RESPIRATION RATE: 17 BRPM | BODY MASS INDEX: 21.69 KG/M2 | DIASTOLIC BLOOD PRESSURE: 68 MMHG | HEART RATE: 57 BPM | HEIGHT: 66 IN

## 2021-12-21 NOTE — ED PROVIDER NOTES
Patient Seen in: [facility] In [place]      History   Patient presents with:  Abdomen/Flank Pain  Constipation    Stated Complaint: r side abd    Subjective:   HPI    64-year-old woman, denies any significant medical history, here for sounds are normal.  Skin: Warm and dry  Neurological: Awake alert, speech is normal        ED Course     Labs Reviewed   COMP METABOLIC PANEL (14) - Abnormal; Notable for the following components:       Result Value    Glucose 111 (*)     BUN 19 (*)     Al were monitored. Treadmill exercise testing was performed using the Luciano protocol. The patient exercised for 6 min 21 sec, to protocol stage 3, to a maximal work rate of 7.7mets. Exercise was terminated due to moderate dyspnea and moderate fatigue.  Post-st minutes and 21 seconds. Baseline: Peak stress: ---------------------------------------------------------------------------- Study data:   Location:  [facility], [address] [place] [address] [address]   Consent:  The risks, benefits, and alternatives lesion. KIDNEYS:  No mass, obstruction, or calcification. ADRENALS:  No mass or enlargement. AORTA/VASCULAR:  Atherosclerosis. No aneurysm or dissection. RETROPERITONEUM:  No mass or adenopathy.   BOWEL/MESENTERY:  Normal caliber small and large bowel was aware and states she has been evaluated by urology previously. Will discharge home with instructions to follow-up closely with her PMD.  Return precautions discussed, patient is in agreement with plan.                              Disposition and Plan

## 2022-08-06 ENCOUNTER — ORDER TRANSCRIPTION (OUTPATIENT)
Dept: ADMINISTRATIVE | Facility: HOSPITAL | Age: 77
End: 2022-08-06

## 2022-08-06 DIAGNOSIS — R20.0 NUMBNESS: Primary | ICD-10-CM

## 2022-08-15 NOTE — IMAGING NOTE
Call placed to pt regarding CTA [place] on 08/17/22. Instructed to arrive at 1215. Instructed to drink plenty of fluids a day prior to procedure and day of procedure. May eat a light breakfast/lunch. Advised to hold caffeine 12 hrs prior to procedure. Pt to take her usual meds. Pt verbalized understanding.

## 2022-08-17 ENCOUNTER — HOSPITAL ENCOUNTER (OUTPATIENT)
Dept: CT IMAGING | Facility: HOSPITAL | Age: 77
Discharge: HOME | End: 2022-08-17
Attending: INTERNAL MEDICINE
Payer: MEDICARE

## 2022-08-17 ENCOUNTER — EKG ENCOUNTER (OUTPATIENT)
Dept: LAB | Facility: HOSPITAL | Age: 77
End: 2022-08-17
Attending: INTERNAL MEDICINE
Payer: MEDICARE

## 2022-08-17 ENCOUNTER — HOSPITAL ENCOUNTER (OUTPATIENT)
Dept: CV DIAGNOSTICS | Facility: HOSPITAL | Age: 77
End: 2022-08-17
Attending: INTERNAL MEDICINE
Payer: MEDICARE

## 2022-08-17 VITALS — DIASTOLIC BLOOD PRESSURE: 62 MMHG | HEART RATE: 62 BPM | SYSTOLIC BLOOD PRESSURE: 143 MMHG | RESPIRATION RATE: 16 BRPM

## 2022-08-17 DIAGNOSIS — R20.0 NUMBNESS: ICD-10-CM

## 2022-08-17 DIAGNOSIS — R20.0 NUMBNESS: Primary | ICD-10-CM

## 2022-08-17 LAB
ATRIAL RATE: 52 BPM
CREAT BLD-MCNC: 0.7 MG/DL
GFR SERPLBLD BASED ON 1.73 SQ M-ARVRAT: 88 ML/MIN/1.73M2 (ref 60–?)
P AXIS: 60 DEGREES
P-R INTERVAL: 264 MS
Q-T INTERVAL: 450 MS
QRS DURATION: 80 MS
QTC CALCULATION (BEZET): 418 MS
R AXIS: -4 DEGREES
T AXIS: 16 DEGREES
VENTRICULAR RATE: 52 BPM

## 2022-08-17 PROCEDURE — 75574 CT ANGIO HRT W/3D IMAGE: CPT | Performed by: INTERNAL MEDICINE

## 2022-08-17 PROCEDURE — 82565 ASSAY OF CREATININE: CPT

## 2022-08-17 PROCEDURE — 93010 ELECTROCARDIOGRAM REPORT: CPT | Performed by: INTERNAL MEDICINE

## 2022-08-17 PROCEDURE — 93005 ELECTROCARDIOGRAM TRACING: CPT

## 2022-08-17 RX ORDER — NITROGLYCERIN 0.4 MG/1
TABLET SUBLINGUAL
Status: COMPLETED
Start: 2022-08-17 | End: 2022-08-17

## 2022-08-17 RX ADMIN — NITROGLYCERIN 0.4 MG: 0.4 TABLET SUBLINGUAL at 13:39:00

## 2022-08-24 ENCOUNTER — HOSPITAL ENCOUNTER (OUTPATIENT)
Dept: CV DIAGNOSTICS | Facility: HOSPITAL | Age: 77
Discharge: HOME | End: 2022-08-24
Attending: INTERNAL MEDICINE
Payer: MEDICARE

## 2022-08-24 DIAGNOSIS — R20.0 NUMBNESS: ICD-10-CM

## 2022-08-24 PROCEDURE — 93306 TTE W/DOPPLER COMPLETE: CPT | Performed by: INTERNAL MEDICINE

## 2022-08-25 ENCOUNTER — APPOINTMENT (OUTPATIENT)
Dept: CT IMAGING | Age: 77
End: 2022-08-25
Attending: EMERGENCY MEDICINE
Payer: MEDICARE

## 2022-08-25 ENCOUNTER — HOSPITAL ENCOUNTER (OUTPATIENT)
Dept: ULTRASOUND IMAGING | Age: 77
Discharge: HOME | End: 2022-08-25
Attending: INTERNAL MEDICINE
Payer: MEDICARE

## 2022-08-25 ENCOUNTER — HOSPITAL ENCOUNTER (EMERGENCY)
Age: 77
Discharge: HOME | End: 2022-08-26
Attending: EMERGENCY MEDICINE
Payer: MEDICARE

## 2022-08-25 DIAGNOSIS — R20.0 NUMBNESS: ICD-10-CM

## 2022-08-25 DIAGNOSIS — R10.31 ABDOMINAL PAIN, RIGHT LOWER QUADRANT: Primary | ICD-10-CM

## 2022-08-25 LAB
ALBUMIN SERPL-MCNC: 3.5 G/DL (ref 3.4–5)
ALBUMIN/GLOB SERPL: 1.1 {RATIO} (ref 1–2)
ALP LIVER SERPL-CCNC: 66 U/L
ALT SERPL-CCNC: 21 U/L
ANION GAP SERPL CALC-SCNC: 7 MMOL/L (ref 0–18)
AST SERPL-CCNC: 22 U/L (ref 15–37)
BASOPHILS # BLD AUTO: 0.03 X10(3) UL (ref 0–0.2)
BASOPHILS NFR BLD AUTO: 0.5 %
BILIRUB SERPL-MCNC: 0.5 MG/DL (ref 0.1–2)
BILIRUB UR QL STRIP.AUTO: NEGATIVE
BUN BLD-MCNC: 20 MG/DL (ref 7–18)
CALCIUM BLD-MCNC: 8.7 MG/DL (ref 8.5–10.1)
CHLORIDE SERPL-SCNC: 104 MMOL/L (ref 98–112)
CLARITY UR REFRACT.AUTO: CLEAR
CO2 SERPL-SCNC: 26 MMOL/L (ref 21–32)
COLOR UR AUTO: YELLOW
CREAT BLD-MCNC: 0.78 MG/DL
EOSINOPHIL # BLD AUTO: 0.12 X10(3) UL (ref 0–0.7)
EOSINOPHIL NFR BLD AUTO: 2.1 %
ERYTHROCYTE [DISTWIDTH] IN BLOOD BY AUTOMATED COUNT: 13 %
GFR SERPLBLD BASED ON 1.73 SQ M-ARVRAT: 78 ML/MIN/1.73M2 (ref 60–?)
GLOBULIN PLAS-MCNC: 3.3 G/DL (ref 2.8–4.4)
GLUCOSE BLD-MCNC: 103 MG/DL (ref 70–99)
GLUCOSE UR STRIP.AUTO-MCNC: NEGATIVE MG/DL
HCT VFR BLD AUTO: 37.9 %
HGB BLD-MCNC: 11.9 G/DL
IMM GRANULOCYTES # BLD AUTO: 0.01 X10(3) UL (ref 0–1)
IMM GRANULOCYTES NFR BLD: 0.2 %
KETONES UR STRIP.AUTO-MCNC: NEGATIVE MG/DL
LEUKOCYTE ESTERASE UR QL STRIP.AUTO: NEGATIVE
LIPASE SERPL-CCNC: 150 U/L (ref 73–393)
LYMPHOCYTES # BLD AUTO: 1.45 X10(3) UL (ref 1–4)
LYMPHOCYTES NFR BLD AUTO: 25.5 %
MCH RBC QN AUTO: 29.3 PG (ref 26–34)
MCHC RBC AUTO-ENTMCNC: 31.4 G/DL (ref 31–37)
MCV RBC AUTO: 93.3 FL
MONOCYTES # BLD AUTO: 0.63 X10(3) UL (ref 0.1–1)
MONOCYTES NFR BLD AUTO: 11.1 %
NEUTROPHILS # BLD AUTO: 3.44 X10 (3) UL (ref 1.5–7.7)
NEUTROPHILS # BLD AUTO: 3.44 X10(3) UL (ref 1.5–7.7)
NEUTROPHILS NFR BLD AUTO: 60.6 %
NITRITE UR QL STRIP.AUTO: NEGATIVE
OSMOLALITY SERPL CALC.SUM OF ELEC: 287 MOSM/KG (ref 275–295)
PH UR STRIP.AUTO: 6 [PH] (ref 5–8)
PLATELET # BLD AUTO: 214 10(3)UL (ref 150–450)
POTASSIUM SERPL-SCNC: 3.6 MMOL/L (ref 3.5–5.1)
PROT SERPL-MCNC: 6.8 G/DL (ref 6.4–8.2)
PROT UR STRIP.AUTO-MCNC: NEGATIVE MG/DL
RBC # BLD AUTO: 4.06 X10(6)UL
SODIUM SERPL-SCNC: 137 MMOL/L (ref 136–145)
SP GR UR STRIP.AUTO: <=1.005 (ref 1–1.03)
UROBILINOGEN UR STRIP.AUTO-MCNC: 0.2 MG/DL
WBC # BLD AUTO: 5.7 X10(3) UL (ref 4–11)

## 2022-08-25 PROCEDURE — 81001 URINALYSIS AUTO W/SCOPE: CPT | Performed by: EMERGENCY MEDICINE

## 2022-08-25 PROCEDURE — 83690 ASSAY OF LIPASE: CPT | Performed by: EMERGENCY MEDICINE

## 2022-08-25 PROCEDURE — 99284 EMERGENCY DEPT VISIT MOD MDM: CPT

## 2022-08-25 PROCEDURE — 80053 COMPREHEN METABOLIC PANEL: CPT | Performed by: EMERGENCY MEDICINE

## 2022-08-25 PROCEDURE — 85025 COMPLETE CBC W/AUTO DIFF WBC: CPT | Performed by: EMERGENCY MEDICINE

## 2022-08-25 PROCEDURE — 96374 THER/PROPH/DIAG INJ IV PUSH: CPT

## 2022-08-25 PROCEDURE — 81015 MICROSCOPIC EXAM OF URINE: CPT | Performed by: EMERGENCY MEDICINE

## 2022-08-25 PROCEDURE — 96375 TX/PRO/DX INJ NEW DRUG ADDON: CPT

## 2022-08-25 PROCEDURE — 74177 CT ABD & PELVIS W/CONTRAST: CPT | Performed by: EMERGENCY MEDICINE

## 2022-08-25 RX ORDER — ONDANSETRON 2 MG/ML
4 INJECTION INTRAMUSCULAR; INTRAVENOUS ONCE
Status: COMPLETED | OUTPATIENT
Start: 2022-08-25 | End: 2022-08-25

## 2022-08-25 RX ORDER — MORPHINE SULFATE 4 MG/ML
2 INJECTION, SOLUTION INTRAMUSCULAR; INTRAVENOUS EVERY 30 MIN PRN
Status: DISCONTINUED | OUTPATIENT
Start: 2022-08-25 | End: 2022-08-26

## 2022-08-26 VITALS
BODY MASS INDEX: 20.89 KG/M2 | HEIGHT: 66 IN | HEART RATE: 61 BPM | DIASTOLIC BLOOD PRESSURE: 62 MMHG | WEIGHT: 130 LBS | RESPIRATION RATE: 18 BRPM | SYSTOLIC BLOOD PRESSURE: 140 MMHG | TEMPERATURE: 98 F | OXYGEN SATURATION: 99 %

## 2022-08-26 RX ORDER — SIMETHICONE 125 MG
1 CAPSULE ORAL 4 TIMES DAILY PRN
Qty: 120 CAPSULE | Refills: 0 | Status: SHIPPED | OUTPATIENT
Start: 2022-08-26 | End: 2022-09-25

## 2022-08-26 NOTE — ED INITIAL ASSESSMENT (HPI)
Patient with pain that started to abdomen 30 minutes prior to arrival by \"appendix\". Denies any N/V at this time. Patient states, \"It feels inflamed\".

## 2022-09-29 ENCOUNTER — HOSPITAL ENCOUNTER (OUTPATIENT)
Dept: ULTRASOUND IMAGING | Age: 77
Discharge: HOME | End: 2022-09-29
Attending: INTERNAL MEDICINE
Payer: MEDICARE

## 2022-09-29 PROCEDURE — 93880 EXTRACRANIAL BILAT STUDY: CPT | Performed by: INTERNAL MEDICINE

## 2023-01-09 ENCOUNTER — OFFICE VISIT (OUTPATIENT)
Dept: INTERNAL MEDICINE CLINIC | Facility: CLINIC | Age: 78
End: 2023-01-09
Payer: COMMERCIAL

## 2023-01-09 VITALS
SYSTOLIC BLOOD PRESSURE: 124 MMHG | DIASTOLIC BLOOD PRESSURE: 84 MMHG | HEIGHT: 66 IN | TEMPERATURE: 98 F | WEIGHT: 128 LBS | BODY MASS INDEX: 20.57 KG/M2 | HEART RATE: 64 BPM | RESPIRATION RATE: 16 BRPM

## 2023-01-09 DIAGNOSIS — R41.3 MEMORY LOSS: ICD-10-CM

## 2023-01-09 DIAGNOSIS — Z28.21 INFLUENZA VACCINATION DECLINED: ICD-10-CM

## 2023-01-09 DIAGNOSIS — R31.0 GROSS HEMATURIA: ICD-10-CM

## 2023-01-09 DIAGNOSIS — Z00.00 ENCOUNTER FOR ANNUAL HEALTH EXAMINATION: Primary | ICD-10-CM

## 2023-01-09 DIAGNOSIS — K21.9 GASTROESOPHAGEAL REFLUX DISEASE, UNSPECIFIED WHETHER ESOPHAGITIS PRESENT: ICD-10-CM

## 2023-01-09 DIAGNOSIS — Z79.899 ENCOUNTER FOR LONG-TERM (CURRENT) USE OF MEDICATIONS: ICD-10-CM

## 2023-01-09 DIAGNOSIS — E78.00 PURE HYPERCHOLESTEROLEMIA: ICD-10-CM

## 2023-01-09 DIAGNOSIS — E04.1 THYROID NODULE: ICD-10-CM

## 2023-01-09 DIAGNOSIS — F41.9 ANXIETY: ICD-10-CM

## 2023-01-09 PROBLEM — R73.9 HYPERGLYCEMIA: Status: RESOLVED | Noted: 2020-07-28 | Resolved: 2023-01-09

## 2023-01-09 PROBLEM — K56.600 PARTIAL INTESTINAL OBSTRUCTION (HCC): Status: RESOLVED | Noted: 2021-01-18 | Resolved: 2023-01-09

## 2023-01-09 PROBLEM — K56.600 PARTIAL INTESTINAL OBSTRUCTION, UNSPECIFIED CAUSE (HCC): Status: RESOLVED | Noted: 2021-01-18 | Resolved: 2023-01-09

## 2023-01-09 PROBLEM — I49.3 PVC (PREMATURE VENTRICULAR CONTRACTION): Status: RESOLVED | Noted: 2017-03-20 | Resolved: 2023-01-09

## 2023-01-09 PROCEDURE — 96160 PT-FOCUSED HLTH RISK ASSMT: CPT | Performed by: PHYSICIAN ASSISTANT

## 2023-01-09 PROCEDURE — 3008F BODY MASS INDEX DOCD: CPT | Performed by: PHYSICIAN ASSISTANT

## 2023-01-09 PROCEDURE — 1126F AMNT PAIN NOTED NONE PRSNT: CPT | Performed by: PHYSICIAN ASSISTANT

## 2023-01-09 PROCEDURE — 3074F SYST BP LT 130 MM HG: CPT | Performed by: PHYSICIAN ASSISTANT

## 2023-01-09 PROCEDURE — 3079F DIAST BP 80-89 MM HG: CPT | Performed by: PHYSICIAN ASSISTANT

## 2023-01-09 PROCEDURE — G0439 PPPS, SUBSEQ VISIT: HCPCS | Performed by: PHYSICIAN ASSISTANT

## 2023-01-09 PROCEDURE — 99387 INIT PM E/M NEW PAT 65+ YRS: CPT | Performed by: PHYSICIAN ASSISTANT

## 2023-01-09 NOTE — PATIENT INSTRUCTIONS
Omeprazole - just take if needed for heartburn   Voltaren gel if needed for arthritis     Kylah Zarco's SCREENING SCHEDULE   Tests on this list are recommended by your physician but may not be covered, or covered at this frequency, by your insurer. Please check with your insurance carrier before scheduling to verify coverage. PREVENTATIVE SERVICES FREQUENCY &  COVERAGE DETAILS LAST COMPLETION DATE   Diabetes Screening    Fasting Blood Sugar /  Glucose    One screening every 12 months if never tested or if previously tested but not diagnosed with pre-diabetes   One screening every 6 months if diagnosed with pre-diabetes Lab Results   Component Value Date     (H) 08/25/2022        Cardiovascular Disease Screening    Lipid Panel  Cholesterol  Lipoprotein (HDL)  Triglycerides Covered every 5 years for all Medicare beneficiaries without apparent signs or symptoms of cardiovascular disease Lab Results   Component Value Date    CHOLEST 190 02/12/2021    HDL 71 (H) 02/12/2021    LDL 96 02/12/2021    TRIG 114 02/12/2021         Electrocardiogram (EKG)   Covered if needed at Welcome to Medicare, and non-screening if indicated for medical reasons 08/17/2022      Ultrasound Screening for Abdominal Aortic Aneurysm (AAA) Covered once in a lifetime for one of the following risk factors    Men who are 73-68 years old and have ever smoked    Anyone with a family history -     Colorectal Cancer Screening  Covered for ages 52-80; only need ONE of the following:    Colonoscopy   Covered every 10 years    Covered every 2 years if patient is at high risk or previous colonoscopy was abnormal -    No recommendations at this time    Flexible Sigmoidoscopy   Covered every 4 years -    Fecal Occult Blood Test Covered annually -   Bone Density Screening    Bone density screening    Covered every 2 years after age 72 if diagnosed with risk of osteoporosis or estrogen deficiency.     Covered yearly for long-term glucocorticoid medication use (Steroids) Last Dexa Scan:    XR DEXA BONE DENSITOMETRY (CPT=77080) 09/17/2016      No recommendations at this time   Pap and Pelvic    Pap   Covered every 2 years for women at normal risk;  Annually if at high risk -  No recommendations at this time    Chlamydia Annually if high risk -  No recommendations at this time   Screening Mammogram    Mammogram     Recommend annually for all female patients aged 36 and older    One baseline mammogram covered for patients aged 32-38 -    No recommendations at this time    Immunizations    Influenza Covered once per flu season  Please get every year -  Influenza Vaccine(1) due on 10/01/2022    Pneumococcal Each vaccine ([id number] & [id number]) covered once after 72 Prevnar 13: 10/15/2016    [id number]: 12/18/2017     No recommendations at this time    Hepatitis B One screening covered for patients with certain risk factors   -  No recommendations at this time    Tetanus Toxoid Not covered by Medicare Part B unless medically necessary (cut with metal); may be covered with your pharmacy prescription benefits -    Tetanus, Diptheria and Pertusis TD and TDaP Not covered by Medicare Part B -  No recommendations at this time    Zoster Not covered by Medicare Part B; may be covered with your pharmacy  prescription benefits -  Zoster Vaccines(1 of 2) Never done

## 2023-01-10 PROBLEM — Z28.21 INFLUENZA VACCINATION DECLINED: Status: ACTIVE | Noted: 2023-01-10

## 2023-01-10 PROBLEM — M51.34 DEGENERATIVE DISC DISEASE, THORACIC: Status: RESOLVED | Noted: 2017-12-13 | Resolved: 2023-01-10

## 2023-01-25 DIAGNOSIS — N30.90 CYSTITIS: Primary | ICD-10-CM

## 2023-01-25 RX ORDER — CEPHALEXIN 500 MG/1
500 CAPSULE ORAL 3 TIMES DAILY
Qty: 15 CAPSULE | Refills: 0 | Status: SHIPPED | OUTPATIENT
Start: 2023-01-25

## 2023-01-26 LAB
ABSOLUTE BASOPHILS: 33 CELLS/UL (ref 0–200)
ABSOLUTE EOSINOPHILS: 132 CELLS/UL (ref 15–500)
ABSOLUTE LYMPHOCYTES: 1762 CELLS/UL (ref 850–3900)
ABSOLUTE MONOCYTES: 429 CELLS/UL (ref 200–950)
ABSOLUTE NEUTROPHILS: 4244 CELLS/UL (ref 1500–7800)
ALBUMIN/GLOBULIN RATIO: 1.6 (CALC) (ref 1–2.5)
ALBUMIN: 4.4 G/DL (ref 3.6–5.1)
ALKALINE PHOSPHATASE: 68 U/L (ref 37–153)
ALT: 21 U/L (ref 6–29)
APPEARANCE: CLEAR
AST: 23 U/L (ref 10–35)
BASOPHILS: 0.5 %
BILIRUBIN, TOTAL: 0.6 MG/DL (ref 0.2–1.2)
BILIRUBIN: NEGATIVE
BUN: 24 MG/DL (ref 7–25)
CALCIUM: 9.6 MG/DL (ref 8.6–10.4)
CARBON DIOXIDE: 34 MMOL/L (ref 20–32)
CHLORIDE: 104 MMOL/L (ref 98–110)
CHOL/HDLC RATIO: 2.1 (CALC)
CHOLESTEROL, TOTAL: 153 MG/DL
COLOR: YELLOW
CREATININE: 0.74 MG/DL (ref 0.6–1)
EGFR: 82 ML/MIN/1.73M2
EOSINOPHILS: 2 %
FOLATE, SERUM: 20 NG/ML
GLOBULIN: 2.7 G/DL (CALC) (ref 1.9–3.7)
GLUCOSE: 89 MG/DL (ref 65–99)
GLUCOSE: NEGATIVE
HDL CHOLESTEROL: 73 MG/DL
HEMATOCRIT: 43 % (ref 35–45)
HEMOGLOBIN: 14.1 G/DL (ref 11.7–15.5)
KETONES: NEGATIVE
LDL-CHOLESTEROL: 63 MG/DL (CALC)
LYMPHOCYTES: 26.7 %
MCH: 29.9 PG (ref 27–33)
MCHC: 32.8 G/DL (ref 32–36)
MCV: 91.1 FL (ref 80–100)
MONOCYTES: 6.5 %
MPV: 9.5 FL (ref 7.5–12.5)
NEUTROPHILS: 64.3 %
NITRITE: NEGATIVE
NON-HDL CHOLESTEROL: 80 MG/DL (CALC)
PH: 6.5 (ref 5–8)
PLATELET COUNT: 279 THOUSAND/UL (ref 140–400)
POTASSIUM: 4.1 MMOL/L (ref 3.5–5.3)
PROTEIN, TOTAL: 7.1 G/DL (ref 6.1–8.1)
PROTEIN: NEGATIVE
RDW: 12 % (ref 11–15)
RED BLOOD CELL COUNT: 4.72 MILLION/UL (ref 3.8–5.1)
SODIUM: 142 MMOL/L (ref 135–146)
SPECIFIC GRAVITY: 1.02 (ref 1–1.03)
TRIGLYCERIDES: 85 MG/DL
TSH W/REFLEX TO FT4: 2.3 MIU/L (ref 0.4–4.5)
VITAMIN B12: 707 PG/ML (ref 200–1100)
WHITE BLOOD CELL COUNT: 6.6 THOUSAND/UL (ref 3.8–10.8)

## 2023-02-15 ENCOUNTER — TELEPHONE (OUTPATIENT)
Dept: INTERNAL MEDICINE CLINIC | Facility: CLINIC | Age: 78
End: 2023-02-15

## 2023-02-15 NOTE — TELEPHONE ENCOUNTER
[facility]   Dr. Devante Spangler MD   [id number], [address]  Ph [phone]  Fx [phone]    CT Chest Abd Pelvis, any imaging     Sent to [facility]

## 2023-09-04 NOTE — IMAGING NOTE
Pt A&O x 4, procedure explained and questions answered. Pt denies long acting nitrates. Pt  assisted to CT table @ 1330, o2 applied at 2L via n/c, see flowsheet for v/s. CT tech Belen  Contrast at 1334  Contrast = 50  Saline = 62    Avg HR = 61    Pt tolerated procedure well, denies s/s of contrast reaction. IV d/c'd at 0345 74 47 21, cath intact, pressure applied and bleeding controlled. Pt ambulatory and escorted to changing room for discharge home. None

## 2023-10-06 NOTE — TELEPHONE ENCOUNTER
Called and talked to patient and she stated she saw a lot of blood in her urine this morning and is very scarred so I told her to keep the appointment today then discussed it with Bentley Kelly PA-c done

## 2024-01-15 ENCOUNTER — TELEPHONE (OUTPATIENT)
Dept: INTERNAL MEDICINE CLINIC | Facility: CLINIC | Age: 79
End: 2024-01-15

## 2024-01-15 NOTE — TELEPHONE ENCOUNTER
Daughter Rose Mary sent message via spouse's myc requesting medical records to be faxed to Dr. Bailon in [place] ph: [phone] .  Pt has upcoming appt.      Contacted above office for fax # [phone].  Office visit and lab results faxed. Rose Mary notified.

## 2024-05-01 ENCOUNTER — TELEPHONE (OUTPATIENT)
Dept: INTERNAL MEDICINE CLINIC | Facility: CLINIC | Age: 79
End: 2024-05-01

## 2024-05-01 ENCOUNTER — OFFICE VISIT (OUTPATIENT)
Dept: INTERNAL MEDICINE CLINIC | Facility: CLINIC | Age: 79
End: 2024-05-01
Payer: COMMERCIAL

## 2024-05-01 VITALS
SYSTOLIC BLOOD PRESSURE: 138 MMHG | HEIGHT: 66 IN | WEIGHT: 129 LBS | BODY MASS INDEX: 20.73 KG/M2 | RESPIRATION RATE: 16 BRPM | HEART RATE: 53 BPM | DIASTOLIC BLOOD PRESSURE: 82 MMHG | OXYGEN SATURATION: 98 % | TEMPERATURE: 97 F

## 2024-05-01 DIAGNOSIS — R31.29 MICROSCOPIC HEMATURIA: ICD-10-CM

## 2024-05-01 DIAGNOSIS — R41.3 MEMORY LOSS: ICD-10-CM

## 2024-05-01 DIAGNOSIS — K21.9 GASTROESOPHAGEAL REFLUX DISEASE, UNSPECIFIED WHETHER ESOPHAGITIS PRESENT: ICD-10-CM

## 2024-05-01 DIAGNOSIS — M15.9 PRIMARY OSTEOARTHRITIS INVOLVING MULTIPLE JOINTS: ICD-10-CM

## 2024-05-01 DIAGNOSIS — N89.8 VAGINAL PRURITUS: ICD-10-CM

## 2024-05-01 DIAGNOSIS — E04.1 THYROID NODULE: ICD-10-CM

## 2024-05-01 DIAGNOSIS — Z00.00 ENCOUNTER FOR ANNUAL HEALTH EXAMINATION: Primary | ICD-10-CM

## 2024-05-01 DIAGNOSIS — R41.3 MEMORY LOSS: Primary | ICD-10-CM

## 2024-05-01 DIAGNOSIS — E78.00 PURE HYPERCHOLESTEROLEMIA: ICD-10-CM

## 2024-05-01 PROCEDURE — 1170F FXNL STATUS ASSESSED: CPT | Performed by: PHYSICIAN ASSISTANT

## 2024-05-01 PROCEDURE — 1126F AMNT PAIN NOTED NONE PRSNT: CPT | Performed by: PHYSICIAN ASSISTANT

## 2024-05-01 PROCEDURE — 3079F DIAST BP 80-89 MM HG: CPT | Performed by: PHYSICIAN ASSISTANT

## 2024-05-01 PROCEDURE — 3008F BODY MASS INDEX DOCD: CPT | Performed by: PHYSICIAN ASSISTANT

## 2024-05-01 PROCEDURE — 96160 PT-FOCUSED HLTH RISK ASSMT: CPT | Performed by: PHYSICIAN ASSISTANT

## 2024-05-01 PROCEDURE — G0439 PPPS, SUBSEQ VISIT: HCPCS | Performed by: PHYSICIAN ASSISTANT

## 2024-05-01 PROCEDURE — 1159F MED LIST DOCD IN RCRD: CPT | Performed by: PHYSICIAN ASSISTANT

## 2024-05-01 PROCEDURE — 1160F RVW MEDS BY RX/DR IN RCRD: CPT | Performed by: PHYSICIAN ASSISTANT

## 2024-05-01 PROCEDURE — 3075F SYST BP GE 130 - 139MM HG: CPT | Performed by: PHYSICIAN ASSISTANT

## 2024-05-01 RX ORDER — CLOTRIMAZOLE 1 %
1 CREAM WITH APPLICATOR VAGINAL NIGHTLY
Qty: 45 G | Refills: 0 | Status: SHIPPED | OUTPATIENT
Start: 2024-05-01 | End: 2024-05-08

## 2024-05-01 NOTE — TELEPHONE ENCOUNTER
Patient would prefer to complete labs through [facility]. Recent lab orders at today's visit ordered for [facility].    Preferred lab changed to [facility]. Lab orders placed through [facility].

## 2024-05-01 NOTE — PATIENT INSTRUCTIONS
Kylah Zarco's SCREENING SCHEDULE   Tests on this list are recommended by your physician but may not be covered, or covered at this frequency, by your insurer.   Please check with your insurance carrier before scheduling to verify coverage.   PREVENTATIVE SERVICES FREQUENCY &  COVERAGE DETAILS LAST COMPLETION DATE   Diabetes Screening    Fasting Blood Sugar /  Glucose    One screening every 12 months if never tested or if previously tested but not diagnosed with pre-diabetes   One screening every 6 months if diagnosed with pre-diabetes Lab Results   Component Value Date    GLU 89 01/23/2023        Cardiovascular Disease Screening    Lipid Panel  Cholesterol  Lipoprotein (HDL)  Triglycerides Covered every 5 years for all Medicare beneficiaries without apparent signs or symptoms of cardiovascular disease Lab Results   Component Value Date    CHOLEST 153 01/23/2023    HDL 73 01/23/2023    LDL 63 01/23/2023    TRIG 85 01/23/2023         Electrocardiogram (EKG)   Covered if needed at Welcome to Medicare, and non-screening if indicated for medical reasons 08/17/2022      Ultrasound Screening for Abdominal Aortic Aneurysm (AAA) Covered once in a lifetime for one of the following risk factors    Men who are 65-75 years old and have ever smoked    Anyone with a family history -     Colorectal Cancer Screening  Covered for ages 50-85; only need ONE of the following:    Colonoscopy   Covered every 10 years    Covered every 2 years if patient is at high risk or previous colonoscopy was abnormal -    No recommendations at this time    Flexible Sigmoidoscopy   Covered every 4 years -    Fecal Occult Blood Test Covered annually -   Bone Density Screening    Bone density screening    Covered every 2 years after age 65 if diagnosed with risk of osteoporosis or estrogen deficiency.    Covered yearly for long-term glucocorticoid medication use (Steroids) Last Dexa Scan:    XR DEXA BONE DENSITOMETRY (CPT=77080)  09/17/2016      No recommendations at this time   Pap and Pelvic    Pap   Covered every 2 years for women at normal risk; Annually if at high risk -  No recommendations at this time    Chlamydia Annually if high risk -  No recommendations at this time   Screening Mammogram    Mammogram     Recommend annually for all female patients aged 40 and older    One baseline mammogram covered for patients aged 35-39 -    No recommendations at this time    Immunizations    Influenza Covered once per flu season  Please get every year -  No recommendations at this time    Pneumococcal Each vaccine (Dlbyzql89 & Ylohrpesi29) covered once after 65 Prevnar 13: 10/15/2016    [id number]: 12/18/2017     No recommendations at this time    Hepatitis B One screening covered for patients with certain risk factors   -  No recommendations at this time    Tetanus Toxoid Not covered by Medicare Part B unless medically necessary (cut with metal); may be covered with your pharmacy prescription benefits -    Tetanus, Diptheria and Pertusis TD and TDaP Not covered by Medicare Part B -  No recommendations at this time    Zoster Not covered by Medicare Part B; may be covered with your pharmacy  prescription benefits -  Zoster Vaccines(1 of 2) Never done

## 2024-05-01 NOTE — PROGRESS NOTES
Subjective:   Kylah Zarco is a 78 year old female who presents for a MA (Medicare Advantage) Supervisit (Once per calendar year) and scheduled follow up of multiple significant but stable problems.     Pt c/o vulvovaginal itching, her gynecologist sent her 2 rounds of fluconazole which helped temporarily but symptoms returned.  Irritation, itchiness; denies discharge, vaginal bleeding or urinary symptoms. No pain.    Otherwise doing great. Stays active, diet is balanced.    History/Other:   Fall Risk Assessment:   She has been screened for Falls and is low risk.      Cognitive Assessment:   Abnormal  What day of the week is this?: Correct  What month is it?: Correct  What year is it?: Incorrect  Recall \"Ball\": Correct  Recall \"Flag\": Correct  Recall \"Tree\": Correct    Functional Ability/Status:   Kylah Zarco has some abnormal functions as listed below:  She has Driving difficulties based on screening of functional status.       Depression Screening (PHQ-2/PHQ-9): PHQ-2 SCORE: 0  , done 5/1/2024   Last [place] Suicide Screening on 5/1/2024 was No Risk.     Advanced Directives:   She does NOT have a Living Will. [Do you have a living will?: No]  She does NOT have a Power of  for Health Care. [Do you have a healthcare power of ?: No]  Discussed Advance Care Planning with patient (and family/surrogate if present). Standard forms made available to patient in After Visit Summary.      Patient Active Problem List   Diagnosis    Thyroid nodule    Microscopic hematuria    Influenza vaccination declined     Allergies:  She has No Known Allergies.    Current Medications:  Outpatient Medications Marked as Taking for the 5/1/24 encounter (Office Visit) with Sabiha Holman PA-C   Medication Sig    diclofenac (VOLTAREN) 1 % External Gel Apply 4 g topically 4 (four) times daily.    clotrimazole 1 % Vaginal Cream Place 1 Applicatorful vaginally at bedtime for 7 days.    vitamin E 400 UNITS Oral  Cap Take 1,000 Units by mouth daily.         Medical History:  She  has a past medical history of Arthritis, Degenerative disc disease, thoracic (12/13/2017), Essential hypertension, Gastroesophageal reflux disease, Gross hematuria (01/09/2023), Hematuria, High cholesterol, Hyperglycemia (07/28/2020), MVP (mitral valve prolapse) (08/30/2016), Osteoporosis, Other and unspecified hyperlipidemia, Partial intestinal obstruction (HCC) (01/18/2021), and PVC (premature ventricular contraction) (03/20/2017).  Surgical History:  She  has a past surgical history that includes total abdom hysterectomy; cystoscopy,+ureteroscopy (Right, 4/17/17); hysterectomy; oophorectomy; and colonoscopy (N/A, 1/20/2021).   Family History:  Her family history is not on file.  Social History:  She  reports that she has never smoked. She has never used smokeless tobacco. She reports that she does not currently use alcohol after a past usage of about 1.0 standard drink of alcohol per week. She reports that she does not use drugs.    Tobacco:  She has never smoked tobacco.    CAGE Alcohol Screen:   CAGE screening score of 0 on 5/1/2024, showing low risk of alcohol abuse.      Patient Care Team:  Lalo Herrera MD as PCP - General (Internal Medicine)  Alexis Houston (SURGERY, GENERAL)  Sabiha Holman PA-C (Physician Assistant)    Review of Systems  GENERAL: feels well otherwise  SKIN: denies any unusual skin lesions  EYES: denies blurred vision or double vision  HEENT: denies nasal congestion, sinus pain or ST  LUNGS: denies shortness of breath with exertion  CARDIOVASCULAR: denies chest pain on exertion  GI: denies abdominal pain, denies heartburn  : denies dysuria, vaginal discharge or itching, no complaint of urinary incontinence   MUSCULOSKELETAL: denies back pain  NEURO: denies headaches  PSYCHE: denies depression or anxiety  HEMATOLOGIC: denies hx of anemia  ENDOCRINE: denies thyroid history  ALL/ASTHMA: denies hx of allergy or  asthma    Objective:   Physical Exam  General Appearance:  Alert, cooperative, no distress, appears stated age   Head:  Normocephalic, without obvious abnormality, atraumatic   Eyes:  PERRL, conjunctiva/corneas clear, EOM's intact both eyes   Ears:  Normal TM's and external ear canals, both ears   Nose: Nares normal, septum midline,mucosa normal, no drainage or sinus tenderness   Throat: Lips, mucosa, and tongue normal; teeth and gums normal   Neck: Supple, symmetrical, trachea midline, no adenopathy;  thyroid: not enlarged, symmetric, no tenderness/mass/nodules; no carotid bruit or JVD   Back:   Symmetric, no curvature, ROM normal, no CVA tenderness   Lungs:   Clear to auscultation bilaterally, respirations unlabored   Heart:  Regular rate and rhythm, S1 and S2 normal, no murmur, rub, or gallop   Abdomen:   Soft, non-tender, bowel sounds active all four quadrants,  no masses, no organomegaly   Pelvic: Deferred   Extremities: Extremities normal, atraumatic, no cyanosis or edema   Pulses: 2+ and symmetric   Skin: Skin color, texture, turgor normal, no rashes or lesions   Lymph nodes: Cervical, supraclavicular, and axillary nodes normal   Neurologic: Normal       /82   Pulse 53   Temp 97.1 °F (36.2 °C)   Resp 16   Ht 5' 6\" (1.676 m)   Wt 129 lb (58.5 kg)   SpO2 98%   BMI 20.82 kg/m²  Estimated body mass index is 20.82 kg/m² as calculated from the following:    Height as of this encounter: 5' 6\" (1.676 m).    Weight as of this encounter: 129 lb (58.5 kg).    Medicare Hearing Assessment:   Hearing Screening    Time taken: 5/1/2024  2:00 PM  Screening Method: Whisper Test  Whisper Test Result: Pass         Visual Acuity:   Right Eye Visual Acuity: Uncorrected Right Eye Chart Acuity: 20/20   Left Eye Visual Acuity: Uncorrected Left Eye Chart Acuity: 20/20   Both Eyes Visual Acuity: Uncorrected Both Eyes Chart Acuity: 20/20   Able To Tolerate Visual Acuity: Yes        Assessment & Plan:   Kylah Zarco is  a 78 year old female who presents for a Medicare Assessment.     1. Encounter for annual health examination (Primary)  2. Memory loss  Mild; reassured, continue healthy lifestyle, check labs as ordered  -     : Vitamin B12  -     : Folic Acid Serum (Folate)  5. Microscopic hematuria  Workup with urology in 2019, no blood on microscopic UA last year. Check labs.  -     : CBC With Differential With Platelet  -     : Comp Metabolic Panel (14)  -     : Urinalysis, Routine  6. Thyroid nodule  FNA biopsy 2018 benign. Stable size 2022. Repeat US 2025.   -     : TSH W Reflex To Free T4  7. Primary osteoarthritis involving multiple joints  -     Diclofenac Sodium; Apply 4 g topically 4 (four) times daily.  Dispense: 20 g; Refill: 0  8. Vaginal pruritus  -     Clotrimazole; Place 1 Applicatorful vaginally at bedtime for 7 days.  Dispense: 45 g; Refill: 0  -f/up for pelvic exam if symptoms return.      The patient indicates understanding of these issues and agrees to the plan.  Reinforced healthy diet, lifestyle, and exercise.      Return in about 1 year (around 5/1/2025) for routine physical, or sooner as needed.     Sabiha Holman PA-C, 5/1/2024     Supplementary Documentation:   General Health:  In the past six months, have you lost more than 10 pounds without trying?: 2 - No  Has your appetite been poor?: No  Type of Diet: Balanced  How does the patient maintain a good energy level?: Appropriate Exercise  How would you describe your daily physical activity?: Heavy  How would you describe your current health state?: Good  How do you maintain positive mental well-being?: Visiting Family;Social Interaction;Puzzles;Games;Visiting Friends  On a scale of 0 to 10, with 0 being no pain and 10 being severe pain, what is your pain level?: 0 - (None)  In the past six months, have you experienced urine leakage?: 0-No  At any time do you feel concerned for the safety/well-being of yourself and/or your children, in your home or  elsewhere?: No  Have you had any immunizations at another office such as Influenza, Hepatitis B, Tetanus, or Pneumococcal?: No         Kylah Zarco's SCREENING SCHEDULE   Tests on this list are recommended by your physician but may not be covered, or covered at this frequency, by your insurer.   Please check with your insurance carrier before scheduling to verify coverage.   PREVENTATIVE SERVICES FREQUENCY &  COVERAGE DETAILS LAST COMPLETION DATE   Diabetes Screening    Fasting Blood Sugar /  Glucose    One screening every 12 months if never tested or if previously tested but not diagnosed with pre-diabetes   One screening every 6 months if diagnosed with pre-diabetes Lab Results   Component Value Date    GLU 89 01/23/2023        Cardiovascular Disease Screening    Lipid Panel  Cholesterol  Lipoprotein (HDL)  Triglycerides Covered every 5 years for all Medicare beneficiaries without apparent signs or symptoms of cardiovascular disease Lab Results   Component Value Date    CHOLEST 153 01/23/2023    HDL 73 01/23/2023    LDL 63 01/23/2023    TRIG 85 01/23/2023         Electrocardiogram (EKG)   Covered if needed at Welcome to Medicare, and non-screening if indicated for medical reasons 08/17/2022      Ultrasound Screening for Abdominal Aortic Aneurysm (AAA) Covered once in a lifetime for one of the following risk factors    Men who are 65-75 years old and have ever smoked    Anyone with a family history -     Colorectal Cancer Screening  Covered for ages 50-85; only need ONE of the following:    Colonoscopy   Covered every 10 years    Covered every 2 years if patient is at high risk or previous colonoscopy was abnormal -    No recommendations at this time    Flexible Sigmoidoscopy   Covered every 4 years -    Fecal Occult Blood Test Covered annually -   Bone Density Screening    Bone density screening    Covered every 2 years after age 65 if diagnosed with risk of osteoporosis or estrogen deficiency.    Covered  yearly for long-term glucocorticoid medication use (Steroids) Last Dexa Scan:    XR DEXA BONE DENSITOMETRY (CPT=77080) 09/17/2016      No recommendations at this time   Pap and Pelvic    Pap   Covered every 2 years for women at normal risk; Annually if at high risk -  No recommendations at this time    Chlamydia Annually if high risk -  No recommendations at this time   Screening Mammogram    Mammogram     Recommend annually for all female patients aged 40 and older    One baseline mammogram covered for patients aged 35-39 -    No recommendations at this time    Immunizations    Influenza Covered once per flu season  Please get every year -  No recommendations at this time    Pneumococcal Each vaccine (Wpohdln33 & Uzhqdybir29) covered once after 65 Prevnar 13: 10/15/2016    [id number]: 12/18/2017     No recommendations at this time    Hepatitis B One screening covered for patients with certain risk factors   -  No recommendations at this time    Tetanus Toxoid Not covered by Medicare Part B unless medically necessary (cut with metal); may be covered with your pharmacy prescription benefits -    Tetanus, Diptheria and Pertusis TD and TDaP Not covered by Medicare Part B -  No recommendations at this time    Zoster Not covered by Medicare Part B; may be covered with your pharmacy  prescription benefits -  Zoster Vaccines(1 of 2) Never done

## 2024-05-02 PROBLEM — E78.00 PURE HYPERCHOLESTEROLEMIA: Status: RESOLVED | Noted: 2017-03-20 | Resolved: 2024-05-02

## 2024-05-02 PROBLEM — R31.0 GROSS HEMATURIA: Status: RESOLVED | Noted: 2023-01-09 | Resolved: 2024-05-02

## 2024-06-07 ENCOUNTER — LAB ENCOUNTER (OUTPATIENT)
Dept: LAB | Age: 79
End: 2024-06-07
Attending: PHYSICIAN ASSISTANT
Payer: MEDICARE

## 2024-06-07 DIAGNOSIS — E78.00 PURE HYPERCHOLESTEROLEMIA: ICD-10-CM

## 2024-06-07 DIAGNOSIS — R31.29 MICROSCOPIC HEMATURIA: ICD-10-CM

## 2024-06-07 DIAGNOSIS — E87.1 HYPONATREMIA: Primary | ICD-10-CM

## 2024-06-07 DIAGNOSIS — R41.3 MEMORY LOSS: ICD-10-CM

## 2024-06-07 DIAGNOSIS — E04.1 THYROID NODULE: ICD-10-CM

## 2024-06-07 DIAGNOSIS — K21.9 GASTROESOPHAGEAL REFLUX DISEASE, UNSPECIFIED WHETHER ESOPHAGITIS PRESENT: ICD-10-CM

## 2024-06-07 LAB
ALBUMIN SERPL-MCNC: 3.9 G/DL (ref 3.4–5)
ALBUMIN/GLOB SERPL: 1.2 {RATIO} (ref 1–2)
ALP LIVER SERPL-CCNC: 86 U/L
ALT SERPL-CCNC: 19 U/L
ANION GAP SERPL CALC-SCNC: 2 MMOL/L (ref 0–18)
AST SERPL-CCNC: 22 U/L (ref 15–37)
BASOPHILS # BLD AUTO: 0.05 X10(3) UL (ref 0–0.2)
BASOPHILS NFR BLD AUTO: 0.9 %
BILIRUB SERPL-MCNC: 0.5 MG/DL (ref 0.1–2)
BILIRUB UR QL STRIP.AUTO: NEGATIVE
BUN BLD-MCNC: 20 MG/DL (ref 9–23)
CALCIUM BLD-MCNC: 9.2 MG/DL (ref 8.5–10.1)
CHLORIDE SERPL-SCNC: 98 MMOL/L (ref 98–112)
CHOLEST SERPL-MCNC: 190 MG/DL (ref ?–200)
CLARITY UR REFRACT.AUTO: CLEAR
CO2 SERPL-SCNC: 30 MMOL/L (ref 21–32)
CREAT BLD-MCNC: 0.8 MG/DL
EGFRCR SERPLBLD CKD-EPI 2021: 74 ML/MIN/1.73M2 (ref 60–?)
EOSINOPHIL # BLD AUTO: 0.16 X10(3) UL (ref 0–0.7)
EOSINOPHIL NFR BLD AUTO: 3 %
ERYTHROCYTE [DISTWIDTH] IN BLOOD BY AUTOMATED COUNT: 13 %
FASTING PATIENT LIPID ANSWER: YES
FASTING STATUS PATIENT QL REPORTED: YES
FOLATE SERPL-MCNC: 20.5 NG/ML (ref 8.7–?)
GLOBULIN PLAS-MCNC: 3.2 G/DL (ref 2.8–4.4)
GLUCOSE BLD-MCNC: 100 MG/DL (ref 70–99)
GLUCOSE UR STRIP.AUTO-MCNC: NORMAL MG/DL
HCT VFR BLD AUTO: 40.4 %
HDLC SERPL-MCNC: 79 MG/DL (ref 40–59)
HGB BLD-MCNC: 13.2 G/DL
IMM GRANULOCYTES # BLD AUTO: 0.01 X10(3) UL (ref 0–1)
IMM GRANULOCYTES NFR BLD: 0.2 %
KETONES UR STRIP.AUTO-MCNC: NEGATIVE MG/DL
LDLC SERPL CALC-MCNC: 94 MG/DL (ref ?–100)
LEUKOCYTE ESTERASE UR QL STRIP.AUTO: NEGATIVE
LYMPHOCYTES # BLD AUTO: 1.48 X10(3) UL (ref 1–4)
LYMPHOCYTES NFR BLD AUTO: 27.3 %
MCH RBC QN AUTO: 30.3 PG (ref 26–34)
MCHC RBC AUTO-ENTMCNC: 32.7 G/DL (ref 31–37)
MCV RBC AUTO: 92.7 FL
MONOCYTES # BLD AUTO: 0.53 X10(3) UL (ref 0.1–1)
MONOCYTES NFR BLD AUTO: 9.8 %
NEUTROPHILS # BLD AUTO: 3.19 X10 (3) UL (ref 1.5–7.7)
NEUTROPHILS # BLD AUTO: 3.19 X10(3) UL (ref 1.5–7.7)
NEUTROPHILS NFR BLD AUTO: 58.8 %
NITRITE UR QL STRIP.AUTO: NEGATIVE
NONHDLC SERPL-MCNC: 111 MG/DL (ref ?–130)
OSMOLALITY SERPL CALC.SUM OF ELEC: 273 MOSM/KG (ref 275–295)
PH UR STRIP.AUTO: 7 [PH] (ref 5–8)
PLATELET # BLD AUTO: 241 10(3)UL (ref 150–450)
POTASSIUM SERPL-SCNC: 4.1 MMOL/L (ref 3.5–5.1)
PROT SERPL-MCNC: 7.1 G/DL (ref 6.4–8.2)
PROT UR STRIP.AUTO-MCNC: NEGATIVE MG/DL
RBC # BLD AUTO: 4.36 X10(6)UL
RBC #/AREA URNS AUTO: >10 /HPF
SODIUM SERPL-SCNC: 130 MMOL/L (ref 136–145)
SP GR UR STRIP.AUTO: 1.01 (ref 1–1.03)
TRIGL SERPL-MCNC: 96 MG/DL (ref 30–149)
TSI SER-ACNC: 2.71 MIU/ML (ref 0.36–3.74)
UROBILINOGEN UR STRIP.AUTO-MCNC: NORMAL MG/DL
VIT B12 SERPL-MCNC: 260 PG/ML (ref 193–986)
VLDLC SERPL CALC-MCNC: 16 MG/DL (ref 0–30)
WBC # BLD AUTO: 5.4 X10(3) UL (ref 4–11)

## 2024-06-07 PROCEDURE — 85025 COMPLETE CBC W/AUTO DIFF WBC: CPT

## 2024-06-07 PROCEDURE — 84443 ASSAY THYROID STIM HORMONE: CPT

## 2024-06-07 PROCEDURE — 80053 COMPREHEN METABOLIC PANEL: CPT

## 2024-06-07 PROCEDURE — 82607 VITAMIN B-12: CPT

## 2024-06-07 PROCEDURE — 36415 COLL VENOUS BLD VENIPUNCTURE: CPT

## 2024-06-07 PROCEDURE — 80061 LIPID PANEL: CPT

## 2024-06-07 PROCEDURE — 81001 URINALYSIS AUTO W/SCOPE: CPT

## 2024-06-07 PROCEDURE — 82746 ASSAY OF FOLIC ACID SERUM: CPT

## 2024-11-13 ENCOUNTER — TELEPHONE (OUTPATIENT)
Dept: INTERNAL MEDICINE CLINIC | Facility: CLINIC | Age: 79
End: 2024-11-13

## 2024-11-13 DIAGNOSIS — N95.2 ATROPHIC VAGINITIS: Primary | ICD-10-CM

## 2024-11-13 RX ORDER — TRIAMCINOLONE ACETONIDE 1 MG/G
CREAM TOPICAL
Qty: 60 G | Refills: 0 | Status: SHIPPED | OUTPATIENT
Start: 2024-11-13

## 2024-11-13 NOTE — TELEPHONE ENCOUNTER
Pt c/o irritation of skin in vulvar area, did not get much improvement with clotrimazole cream  Trial triamcinolone cream once daily x 7 days then prn, use sparingly. Discussed medication indications, risks, alternatives and side effects.   Pt expresses understanding and agrees to the plan.

## 2024-12-06 ENCOUNTER — TELEPHONE (OUTPATIENT)
Dept: INTERNAL MEDICINE CLINIC | Facility: CLINIC | Age: 79
End: 2024-12-06

## 2024-12-06 DIAGNOSIS — N95.2 ATROPHIC VAGINITIS: ICD-10-CM

## 2024-12-06 RX ORDER — TRIAMCINOLONE ACETONIDE 1 MG/G
CREAM TOPICAL
Qty: 60 G | Refills: 0 | Status: SHIPPED | OUTPATIENT
Start: 2024-12-06

## 2024-12-06 NOTE — TELEPHONE ENCOUNTER
Pt never picked up triamcinolone rx for vaginal symptoms, requesting we renew the rx at pharmacy so she can get it.

## 2025-01-26 NOTE — PROGRESS NOTES
Subjective:   Kylah Zarco is a 79 year old female who presents for a MA AHA (Medicare Advantage Annual Health Assessment) and Subsequent Annual Wellness visit (Pt already had Initial Annual Wellness) and scheduled follow up of multiple significant but stable problems.     Dizziness  This is a new problem. The current episode started 1 to 4 weeks ago (x 2 weeks). The problem occurs rarely. The problem has been unchanged. Pertinent negatives include no abdominal pain, anorexia, arthralgias, change in bowel habit, chest pain, chills, congestion, coughing, diaphoresis, fatigue, fever, headaches, joint swelling, myalgias, nausea, neck pain, numbness, rash, sore throat, swollen glands, urinary symptoms, vertigo, visual change, vomiting or weakness. Nothing aggravates the symptoms. She has tried nothing for the symptoms.   Pt states it happens when she moves fast, it last for few seconds. Happens maybe once a day.  Denies room spinning. Pt states she does not drink enough water and has decreased appetite so does not consume a lot of calories.     History/Other:   Fall Risk Assessment:   She has been screened for Falls and is low risk.      Cognitive Assessment:   Abnormal  What day of the week is this?: Incorrect  What month is it?: Correct  What year is it?: Incorrect  Recall \"Ball\": Correct  Recall \"Flag\": Correct  Recall \"Tree\": Correct      Functional Ability/Status:   Kylah Zarco has some abnormal functions as listed below:  She has Driving difficulties based on screening of functional status. She has problems with Memory based on screening of functional status.       Depression Screening (PHQ):  PHQ-2 SCORE: 0  , done 1/28/2025            Advanced Directives:   She does NOT have a Living Will. [Do you have a living will?: No]  She does NOT have a Power of  for Health Care. [Do you have a healthcare power of ?: No]  Discussed Advance Care Planning with patient (and family/surrogate if  present). Standard forms made available to patient in After Visit Summary.      Patient Active Problem List   Diagnosis    Thyroid nodule    Microscopic hematuria    Influenza vaccination declined     Allergies:  She has No Known Allergies.    Current Medications:  Outpatient Medications Marked as Taking for the 1/28/25 encounter (Office Visit) with Radha Buck APRN   Medication Sig    triamcinolone 0.1 % External Cream Aplica un poquito de crema en el area afectada cada connor para 7 frank, y despues ahmet vez a la semana bertha se necesite    diclofenac (VOLTAREN) 1 % External Gel Apply 4 g topically 4 (four) times daily.    vitamin E 400 UNITS Oral Cap Take 1,000 Units by mouth daily.         Medical History:  She  has a past medical history of Arthritis, Degenerative disc disease, thoracic (12/13/2017), Essential hypertension, Gastroesophageal reflux disease, Gross hematuria (01/09/2023), Hematuria, High cholesterol, Hyperglycemia (07/28/2020), MVP (mitral valve prolapse) (08/30/2016), Osteoporosis, Other and unspecified hyperlipidemia, Partial intestinal obstruction (HCC) (01/18/2021), and PVC (premature ventricular contraction) (03/20/2017).  Surgical History:  She  has a past surgical history that includes total abdom hysterectomy; cystoscopy,+ureteroscopy (Right, 4/17/17); hysterectomy; oophorectomy; and colonoscopy (N/A, 1/20/2021).   Family History:  Her family history is not on file.  Social History:  She  reports that she has never smoked. She has never used smokeless tobacco. She reports that she does not currently use alcohol after a past usage of about 1.0 standard drink of alcohol per week. She reports that she does not use drugs.    Tobacco:  She has never smoked tobacco.    CAGE Alcohol Screen:   CAGE screening score of 0 on 1/28/2025, showing low risk of alcohol abuse.      Patient Care Team:  Lalo Herrera MD as PCP - General (Internal Medicine)  Alexis Houston (SURGERY, GENERAL)  Manda  ALONZO Landis (Physician Assistant)    Review of Systems   Constitutional:  Negative for chills, diaphoresis, fatigue and fever.   HENT:  Negative for congestion and sore throat.    Respiratory:  Negative for cough.    Cardiovascular:  Negative for chest pain.   Gastrointestinal:  Negative for abdominal pain, anorexia, change in bowel habit, nausea and vomiting.   Musculoskeletal:  Negative for arthralgias, joint swelling, myalgias and neck pain.   Skin:  Negative for rash.   Neurological:  Positive for dizziness. Negative for vertigo, weakness, numbness and headaches.     GENERAL: feels well otherwise  SKIN: denies any unusual skin lesions  EYES: denies blurred vision or double vision  HEENT: denies nasal congestion, sinus pain or ST  LUNGS: denies shortness of breath with exertion  CARDIOVASCULAR: denies chest pain on exertion  GI: denies abdominal pain, denies heartburn  : denies dysuria, vaginal discharge or itching, no complaint of urinary incontinence   MUSCULOSKELETAL: denies back pain  NEURO: denies headaches  PSYCHE: denies depression or anxiety  HEMATOLOGIC: denies hx of anemia  ENDOCRINE: denies thyroid history  ALL/ASTHMA: denies hx of allergy or asthma    Objective:   Physical Exam  General Appearance:  Alert, cooperative, no distress, appears stated age   Head:  Normocephalic, without obvious abnormality, atraumatic   Eyes:  PERRL, conjunctiva/corneas clear, EOM's intact both eyes   Ears:  Normal TM's and external ear canals, both ears   Nose: Nares normal, septum midline,mucosa normal, no drainage or sinus tenderness   Throat: Lips, mucosa, and tongue normal; teeth and gums normal   Neck: Supple, symmetrical, trachea midline, no adenopathy;  thyroid: not enlarged, symmetric, no tenderness/mass/nodules; no carotid bruit or JVD   Back:   Symmetric, no curvature, ROM normal, no CVA tenderness   Lungs:   Clear to auscultation bilaterally, respirations unlabored   Heart:  Regular rate and rhythm, S1  and S2 normal, no murmur, rub, or gallop   Abdomen:   Soft, non-tender, bowel sounds active all four quadrants,  no masses, no organomegaly   Pelvic: Deferred   Extremities: Extremities normal, atraumatic, no cyanosis or edema   Pulses: 2+ and symmetric   Skin: Skin color, texture, turgor normal, no rashes or lesions   Lymph nodes: Cervical, supraclavicular, and axillary nodes normal   Neurologic: Normal       /78   Pulse 68   Temp 97.8 °F (36.6 °C)   Resp 16   Ht 5' 6\" (1.676 m)   Wt 130 lb (59 kg)   SpO2 98%   BMI 20.98 kg/m²  Estimated body mass index is 20.98 kg/m² as calculated from the following:    Height as of this encounter: 5' 6\" (1.676 m).    Weight as of this encounter: 130 lb (59 kg).    Medicare Hearing Assessment:   Hearing Screening    Time taken: 1/28/2025  8:54 AM  Screening Method: Finger Rub  Finger Rub Result: Pass         Visual Acuity:   Right Eye Visual Acuity: Uncorrected Right Eye Chart Acuity: 20/40   Left Eye Visual Acuity: Uncorrected Left Eye Chart Acuity: 20/40   Both Eyes Visual Acuity: Uncorrected Both Eyes Chart Acuity: 20/40   Able To Tolerate Visual Acuity: Yes        Assessment & Plan:   Kylah Zarco is a 79 year old female who presents for a Medicare Assessment.     1. Medicare annual wellness visit, subsequent (Primary)  2. Thyroid nodule  Overview:  FNA biopsy 2018 benign. Stable size 2022.Orders:  -     US THYROID (CPT=76536); Future; Expected date: 01/28/2025  3. Microscopic hematuria  Pt last saw urology in 2019   -     Urinalysis, Routine; Future; Expected date: 01/28/2025  -     Urology Referral - In Network  4. Long-term use of high-risk medication  -     CBC With Differential With Platelet; Future; Expected date: 01/28/2025  -     Comp Metabolic Panel (14); Future; Expected date: 01/28/2025  -     Lipid Panel; Future; Expected date: 01/28/2025  -     TSH W Reflex To Free T4; Future; Expected date: 01/28/2025  5. Hyponatremia   -recheck CMP  6.  Dizziness   -check labs. Encouraged increase water intake (discussed not to exceed 1.5L due to hyponatremia) and start to consume daily ensure. If labs are not indicate of the cause and symptoms persist pt is aware to f/u in office.  The patient indicates understanding of these issues and agrees to the plan.  Reinforced healthy diet, lifestyle, and exercise.      Return in about 6 months (around 7/28/2025) for med check.     RILEY Burkett, 1/26/2025     Supplementary Documentation:   General Health:  In the past six months, have you lost more than 10 pounds without trying?: 2 - No  Has your appetite been poor?: No  Type of Diet: Balanced  How does the patient maintain a good energy level?: Daily Walks  How would you describe your daily physical activity?: Moderate  How would you describe your current health state?: Good  How do you maintain positive mental well-being?: Visiting Family  On a scale of 0 to 10, with 0 being no pain and 10 being severe pain, what is your pain level?: 0 - (None)  In the past six months, have you experienced urine leakage?: 0-No  At any time do you feel concerned for the safety/well-being of yourself and/or your children, in your home or elsewhere?: No  Have you had any immunizations at another office such as Influenza, Hepatitis B, Tetanus, or Pneumococcal?: No    Health Maintenance   Topic Date Due    Zoster Vaccines (1 of 2) Never done    COVID-19 Vaccine (4 - 2024-25 season) 09/01/2024    Influenza Vaccine (1) 10/01/2024    Annual Well Visit  01/01/2025    Annual Depression Screening  01/01/2025    Fall Risk Screening (Annual)  01/01/2025    DEXA Scan  Completed    Pneumococcal Vaccine: 50+ Years  Completed    Meningococcal B Vaccine  Aged Out

## 2025-01-28 ENCOUNTER — LAB ENCOUNTER (OUTPATIENT)
Dept: LAB | Age: 80
End: 2025-01-28
Payer: MEDICARE

## 2025-01-28 ENCOUNTER — OFFICE VISIT (OUTPATIENT)
Dept: INTERNAL MEDICINE CLINIC | Facility: CLINIC | Age: 80
End: 2025-01-28
Payer: COMMERCIAL

## 2025-01-28 VITALS
WEIGHT: 130 LBS | RESPIRATION RATE: 16 BRPM | DIASTOLIC BLOOD PRESSURE: 78 MMHG | HEART RATE: 68 BPM | SYSTOLIC BLOOD PRESSURE: 130 MMHG | TEMPERATURE: 98 F | OXYGEN SATURATION: 98 % | HEIGHT: 66 IN | BODY MASS INDEX: 20.89 KG/M2

## 2025-01-28 DIAGNOSIS — E04.1 THYROID NODULE: ICD-10-CM

## 2025-01-28 DIAGNOSIS — E87.1 HYPONATREMIA: ICD-10-CM

## 2025-01-28 DIAGNOSIS — R31.29 MICROSCOPIC HEMATURIA: ICD-10-CM

## 2025-01-28 DIAGNOSIS — Z79.899 LONG-TERM USE OF HIGH-RISK MEDICATION: ICD-10-CM

## 2025-01-28 DIAGNOSIS — Z00.00 MEDICARE ANNUAL WELLNESS VISIT, SUBSEQUENT: Primary | ICD-10-CM

## 2025-01-28 DIAGNOSIS — R42 DIZZINESS: ICD-10-CM

## 2025-01-28 LAB
ALBUMIN SERPL-MCNC: 4.5 G/DL (ref 3.2–4.8)
ALBUMIN/GLOB SERPL: 1.6 {RATIO} (ref 1–2)
ALP LIVER SERPL-CCNC: 73 U/L
ALT SERPL-CCNC: 13 U/L
ANION GAP SERPL CALC-SCNC: 11 MMOL/L (ref 0–18)
AST SERPL-CCNC: 28 U/L (ref ?–34)
BASOPHILS # BLD AUTO: 0.03 X10(3) UL (ref 0–0.2)
BASOPHILS NFR BLD AUTO: 0.4 %
BILIRUB SERPL-MCNC: 0.8 MG/DL (ref 0.2–1.1)
BILIRUB UR QL STRIP.AUTO: NEGATIVE
BUN BLD-MCNC: 21 MG/DL (ref 9–23)
CALCIUM BLD-MCNC: 9.6 MG/DL (ref 8.7–10.6)
CHLORIDE SERPL-SCNC: 104 MMOL/L (ref 98–112)
CHOLEST SERPL-MCNC: 212 MG/DL (ref ?–200)
CLARITY UR REFRACT.AUTO: CLEAR
CO2 SERPL-SCNC: 28 MMOL/L (ref 21–32)
COLOR UR AUTO: YELLOW
CREAT BLD-MCNC: 0.81 MG/DL
EGFRCR SERPLBLD CKD-EPI 2021: 73 ML/MIN/1.73M2 (ref 60–?)
EOSINOPHIL # BLD AUTO: 0.09 X10(3) UL (ref 0–0.7)
EOSINOPHIL NFR BLD AUTO: 1.3 %
ERYTHROCYTE [DISTWIDTH] IN BLOOD BY AUTOMATED COUNT: 13.1 %
FASTING PATIENT LIPID ANSWER: YES
FASTING STATUS PATIENT QL REPORTED: YES
GLOBULIN PLAS-MCNC: 2.8 G/DL (ref 2–3.5)
GLUCOSE BLD-MCNC: 94 MG/DL (ref 70–99)
GLUCOSE UR STRIP.AUTO-MCNC: NORMAL MG/DL
HCT VFR BLD AUTO: 42.5 %
HDLC SERPL-MCNC: 84 MG/DL (ref 40–59)
HGB BLD-MCNC: 13.6 G/DL
IMM GRANULOCYTES # BLD AUTO: 0.02 X10(3) UL (ref 0–1)
IMM GRANULOCYTES NFR BLD: 0.3 %
KETONES UR STRIP.AUTO-MCNC: NEGATIVE MG/DL
LDLC SERPL CALC-MCNC: 115 MG/DL (ref ?–100)
LEUKOCYTE ESTERASE UR QL STRIP.AUTO: NEGATIVE
LYMPHOCYTES # BLD AUTO: 1.55 X10(3) UL (ref 1–4)
LYMPHOCYTES NFR BLD AUTO: 22.8 %
MCH RBC QN AUTO: 29.8 PG (ref 26–34)
MCHC RBC AUTO-ENTMCNC: 32 G/DL (ref 31–37)
MCV RBC AUTO: 93.2 FL
MONOCYTES # BLD AUTO: 0.56 X10(3) UL (ref 0.1–1)
MONOCYTES NFR BLD AUTO: 8.2 %
NEUTROPHILS # BLD AUTO: 4.54 X10 (3) UL (ref 1.5–7.7)
NEUTROPHILS # BLD AUTO: 4.54 X10(3) UL (ref 1.5–7.7)
NEUTROPHILS NFR BLD AUTO: 67 %
NITRITE UR QL STRIP.AUTO: NEGATIVE
NONHDLC SERPL-MCNC: 128 MG/DL (ref ?–130)
OSMOLALITY SERPL CALC.SUM OF ELEC: 299 MOSM/KG (ref 275–295)
PH UR STRIP.AUTO: 6 [PH] (ref 5–8)
PLATELET # BLD AUTO: 242 10(3)UL (ref 150–450)
POTASSIUM SERPL-SCNC: 4.2 MMOL/L (ref 3.5–5.1)
PROT SERPL-MCNC: 7.3 G/DL (ref 5.7–8.2)
PROT UR STRIP.AUTO-MCNC: NEGATIVE MG/DL
RBC # BLD AUTO: 4.56 X10(6)UL
RBC #/AREA URNS AUTO: >10 /HPF
SODIUM SERPL-SCNC: 143 MMOL/L (ref 136–145)
SP GR UR STRIP.AUTO: 1.02 (ref 1–1.03)
TRIGL SERPL-MCNC: 73 MG/DL (ref 30–149)
TSI SER-ACNC: 2.23 UIU/ML (ref 0.55–4.78)
UROBILINOGEN UR STRIP.AUTO-MCNC: NORMAL MG/DL
VLDLC SERPL CALC-MCNC: 13 MG/DL (ref 0–30)
WBC # BLD AUTO: 6.8 X10(3) UL (ref 4–11)

## 2025-01-28 PROCEDURE — 36415 COLL VENOUS BLD VENIPUNCTURE: CPT

## 2025-01-28 PROCEDURE — 80053 COMPREHEN METABOLIC PANEL: CPT

## 2025-01-28 PROCEDURE — 80061 LIPID PANEL: CPT

## 2025-01-28 PROCEDURE — 85025 COMPLETE CBC W/AUTO DIFF WBC: CPT

## 2025-01-28 PROCEDURE — 84443 ASSAY THYROID STIM HORMONE: CPT

## 2025-01-28 PROCEDURE — 81001 URINALYSIS AUTO W/SCOPE: CPT

## 2025-04-06 NOTE — PROGRESS NOTES
Kylah Zarco is a 79 year old female.  HPI:   Knee Pain   The pain is present in the left knee. This is a new problem. The current episode started more than 1 month ago. There has been no history of extremity trauma. The problem occurs constantly. The problem has been unchanged. The quality of the pain is described as aching. The pain is moderate. Pertinent negatives include no fever, inability to bear weight, itching, joint locking, joint swelling, limited range of motion, numbness, stiffness or tingling. The symptoms are aggravated by activity. Treatments tried: valtaren cream. The treatment provided significant relief. Family history does not include gout or rheumatoid arthritis.      Current Outpatient Medications   Medication Sig Dispense Refill    triamcinolone 0.1 % External Cream Aplica un poquito de crema en el area afectada cada connor para 7 frank, y despues ahmet vez a la semana bertha se necesite 60 g 0    diclofenac (VOLTAREN) 1 % External Gel Apply 4 g topically 4 (four) times daily. 20 g 0    vitamin E 400 UNITS Oral Cap Take 1,000 Units by mouth daily.          Past Medical History:    Arthritis    Degenerative disc disease, thoracic    Noted on CXR from 9/2017    Essential hypertension    Gastroesophageal reflux disease    Gross hematuria    Hematuria    High cholesterol    Hyperglycemia    MVP (mitral valve prolapse)    Osteoporosis    Other and unspecified hyperlipidemia    Partial intestinal obstruction (HCC)    PVC (premature ventricular contraction)      Social History:  Social History     Socioeconomic History    Marital status:    Occupational History    Occupation: Retired    Tobacco Use    Smoking status: Never    Smokeless tobacco: Never   Vaping Use    Vaping status: Never Used   Substance and Sexual Activity    Alcohol use: Not Currently     Alcohol/week: 1.0 standard drink of alcohol     Types: 1 Standard drinks or equivalent per week    Drug use: No   Other Topics Concern     Caffeine Concern No     Comment: one cup weekly    Stress Concern No    Exercise No     Comment: house work    Seat Belt Yes    Self-Exams Yes     Comment: self breast exam with showering        REVIEW OF SYSTEMS:   Review of Systems   Constitutional: Negative.  Negative for fever.   Respiratory: Negative.     Cardiovascular: Negative.    Musculoskeletal:  Positive for arthralgias. Negative for stiffness.   Skin:  Negative for itching.   Neurological:  Positive for dizziness. Negative for tingling, weakness and numbness.   Psychiatric/Behavioral: Negative.           EXAM:   /70   Pulse 59   Temp 98.8 °F (37.1 °C)   Resp 16   Ht 5' 6\" (1.676 m)   Wt 128 lb (58.1 kg)   SpO2 97%   BMI 20.66 kg/m²   Physical Exam  Vitals and nursing note reviewed.   Constitutional:       Appearance: Normal appearance. She is normal weight.   Cardiovascular:      Rate and Rhythm: Normal rate and regular rhythm.      Pulses: Normal pulses.      Heart sounds: Normal heart sounds.   Pulmonary:      Effort: Pulmonary effort is normal.      Breath sounds: Normal breath sounds.   Musculoskeletal:         General: No swelling, tenderness, deformity or signs of injury.   Skin:     General: Skin is warm and dry.      Capillary Refill: Capillary refill takes less than 2 seconds.   Neurological:      General: No focal deficit present.      Mental Status: She is alert and oriented to person, place, and time. Mental status is at baseline.   Psychiatric:         Mood and Affect: Mood normal.         Behavior: Behavior normal.         Thought Content: Thought content normal.         Judgment: Judgment normal.          ASSESSMENT AND PLAN:   Diagnoses and all orders for this visit:    Acute pain of left knee  Primary osteoarthritis of left knee   -pt has pain resolution with Volteran cream. Rec to continue when it flares up, if no resolution then could taken Ibuprofen with food PRN.   Requested Prescriptions      No prescriptions requested  or ordered in this encounter         The patient indicates understanding of these issues and agrees to the plan.  The patient is asked to return if symptoms .

## 2025-04-09 ENCOUNTER — OFFICE VISIT (OUTPATIENT)
Dept: INTERNAL MEDICINE CLINIC | Facility: CLINIC | Age: 80
End: 2025-04-09
Payer: COMMERCIAL

## 2025-04-09 VITALS
SYSTOLIC BLOOD PRESSURE: 122 MMHG | OXYGEN SATURATION: 97 % | WEIGHT: 128 LBS | TEMPERATURE: 99 F | BODY MASS INDEX: 20.57 KG/M2 | RESPIRATION RATE: 16 BRPM | DIASTOLIC BLOOD PRESSURE: 70 MMHG | HEART RATE: 59 BPM | HEIGHT: 66 IN

## 2025-04-09 DIAGNOSIS — M17.12 PRIMARY OSTEOARTHRITIS OF LEFT KNEE: ICD-10-CM

## 2025-04-09 DIAGNOSIS — M25.562 ACUTE PAIN OF LEFT KNEE: Primary | ICD-10-CM

## 2025-04-09 PROCEDURE — 3008F BODY MASS INDEX DOCD: CPT

## 2025-04-09 PROCEDURE — 1170F FXNL STATUS ASSESSED: CPT

## 2025-04-09 PROCEDURE — 3078F DIAST BP <80 MM HG: CPT

## 2025-04-09 PROCEDURE — 1160F RVW MEDS BY RX/DR IN RCRD: CPT

## 2025-04-09 PROCEDURE — G2211 COMPLEX E/M VISIT ADD ON: HCPCS

## 2025-04-09 PROCEDURE — 3074F SYST BP LT 130 MM HG: CPT

## 2025-04-09 PROCEDURE — 99213 OFFICE O/P EST LOW 20 MIN: CPT

## 2025-04-09 PROCEDURE — 1159F MED LIST DOCD IN RCRD: CPT

## 2025-04-09 PROCEDURE — 1125F AMNT PAIN NOTED PAIN PRSNT: CPT

## 2025-05-09 ENCOUNTER — HOSPITAL ENCOUNTER (INPATIENT)
Facility: HOSPITAL | Age: 80
LOS: 3 days | Discharge: HOME | End: 2025-05-12
Attending: EMERGENCY MEDICINE | Admitting: HOSPITALIST
Payer: MEDICARE

## 2025-05-09 ENCOUNTER — APPOINTMENT (OUTPATIENT)
Dept: GENERAL RADIOLOGY | Age: 80
End: 2025-05-09
Attending: EMERGENCY MEDICINE
Payer: MEDICARE

## 2025-05-09 ENCOUNTER — OFFICE VISIT (OUTPATIENT)
Dept: INTERNAL MEDICINE CLINIC | Facility: CLINIC | Age: 80
End: 2025-05-09
Payer: COMMERCIAL

## 2025-05-09 VITALS
RESPIRATION RATE: 17 BRPM | OXYGEN SATURATION: 95 % | HEIGHT: 66 IN | SYSTOLIC BLOOD PRESSURE: 152 MMHG | DIASTOLIC BLOOD PRESSURE: 68 MMHG | TEMPERATURE: 103 F | HEART RATE: 89 BPM | BODY MASS INDEX: 21 KG/M2

## 2025-05-09 DIAGNOSIS — B34.9 ACUTE VIRAL SYNDROME: ICD-10-CM

## 2025-05-09 DIAGNOSIS — R11.0 NAUSEA: Primary | ICD-10-CM

## 2025-05-09 DIAGNOSIS — R50.9 FEVER, UNSPECIFIED FEVER CAUSE: Primary | ICD-10-CM

## 2025-05-09 DIAGNOSIS — R11.2 NAUSEA VOMITING AND DIARRHEA: ICD-10-CM

## 2025-05-09 DIAGNOSIS — R03.0 ELEVATED BLOOD PRESSURE READING: ICD-10-CM

## 2025-05-09 DIAGNOSIS — E87.1 HYPONATREMIA: ICD-10-CM

## 2025-05-09 DIAGNOSIS — R19.7 DIARRHEA OF PRESUMED INFECTIOUS ORIGIN: ICD-10-CM

## 2025-05-09 DIAGNOSIS — R19.7 NAUSEA VOMITING AND DIARRHEA: ICD-10-CM

## 2025-05-09 DIAGNOSIS — E87.20 LACTIC ACIDOSIS: ICD-10-CM

## 2025-05-09 PROBLEM — R73.9 HYPERGLYCEMIA: Status: ACTIVE | Noted: 2025-05-09

## 2025-05-09 LAB
ALBUMIN SERPL-MCNC: 4.6 G/DL (ref 3.2–4.8)
ALBUMIN/GLOB SERPL: 1.7 {RATIO} (ref 1–2)
ALP LIVER SERPL-CCNC: 102 U/L (ref 55–142)
ALT SERPL-CCNC: 56 U/L (ref 10–49)
ANION GAP SERPL CALC-SCNC: 6 MMOL/L (ref 0–18)
AST SERPL-CCNC: 60 U/L (ref ?–34)
BASOPHILS # BLD AUTO: 0.01 X10(3) UL (ref 0–0.2)
BASOPHILS NFR BLD AUTO: 0.1 %
BILIRUB SERPL-MCNC: 0.8 MG/DL (ref 0.2–1.1)
BILIRUB UR QL STRIP.AUTO: NEGATIVE
BUN BLD-MCNC: 11 MG/DL (ref 9–23)
CALCIUM BLD-MCNC: 8.9 MG/DL (ref 8.7–10.6)
CHLORIDE SERPL-SCNC: 97 MMOL/L (ref 98–112)
CLARITY UR REFRACT.AUTO: CLEAR
CO2 SERPL-SCNC: 28 MMOL/L (ref 21–32)
COLOR UR AUTO: YELLOW
CREAT BLD-MCNC: 0.82 MG/DL (ref 0.55–1.02)
EGFRCR SERPLBLD CKD-EPI 2021: 72 ML/MIN/1.73M2 (ref 60–?)
EOSINOPHIL # BLD AUTO: 0 X10(3) UL (ref 0–0.7)
EOSINOPHIL NFR BLD AUTO: 0 %
ERYTHROCYTE [DISTWIDTH] IN BLOOD BY AUTOMATED COUNT: 12.9 %
FLUAV + FLUBV RNA SPEC NAA+PROBE: NEGATIVE
FLUAV + FLUBV RNA SPEC NAA+PROBE: NEGATIVE
GLOBULIN PLAS-MCNC: 2.7 G/DL (ref 2–3.5)
GLUCOSE BLD-MCNC: 148 MG/DL (ref 70–99)
GLUCOSE UR STRIP.AUTO-MCNC: NEGATIVE MG/DL
HCT VFR BLD AUTO: 41.2 % (ref 35–48)
HGB BLD-MCNC: 13.5 G/DL (ref 12–16)
IMM GRANULOCYTES # BLD AUTO: 0.02 X10(3) UL (ref 0–1)
IMM GRANULOCYTES NFR BLD: 0.3 %
KETONES UR STRIP.AUTO-MCNC: NEGATIVE MG/DL
LACTATE SERPL-SCNC: 0.9 MMOL/L (ref 0.5–2)
LACTATE SERPL-SCNC: 2.2 MMOL/L (ref 0.5–2)
LEUKOCYTE ESTERASE UR QL STRIP.AUTO: NEGATIVE
LYMPHOCYTES # BLD AUTO: 0.33 X10(3) UL (ref 1–4)
LYMPHOCYTES NFR BLD AUTO: 4.5 %
MCH RBC QN AUTO: 29.9 PG (ref 26–34)
MCHC RBC AUTO-ENTMCNC: 32.8 G/DL (ref 31–37)
MCV RBC AUTO: 91.4 FL (ref 80–100)
MONOCYTES # BLD AUTO: 0.07 X10(3) UL (ref 0.1–1)
MONOCYTES NFR BLD AUTO: 1 %
NEUTROPHILS # BLD AUTO: 6.92 X10 (3) UL (ref 1.5–7.7)
NEUTROPHILS # BLD AUTO: 6.92 X10(3) UL (ref 1.5–7.7)
NEUTROPHILS NFR BLD AUTO: 94.1 %
NITRITE UR QL STRIP.AUTO: NEGATIVE
OSMOLALITY SERPL CALC.SUM OF ELEC: 274 MOSM/KG (ref 275–295)
PH UR STRIP.AUTO: 7 [PH] (ref 5–8)
PLATELET # BLD AUTO: 176 10(3)UL (ref 150–450)
POCT INFLUENZA A: NEGATIVE
POCT INFLUENZA B: NEGATIVE
POTASSIUM SERPL-SCNC: 3.7 MMOL/L (ref 3.5–5.1)
PROT SERPL-MCNC: 7.3 G/DL (ref 5.7–8.2)
RBC # BLD AUTO: 4.51 X10(6)UL (ref 3.8–5.3)
RBC #/AREA URNS AUTO: >10 /HPF
RSV RNA SPEC NAA+PROBE: NEGATIVE
SARS-COV-2 RNA RESP QL NAA+PROBE: NOT DETECTED
SARS-COV-2 RNA RESP QL NAA+PROBE: NOT DETECTED
SODIUM SERPL-SCNC: 131 MMOL/L (ref 136–145)
SP GR UR STRIP.AUTO: 1.02 (ref 1–1.03)
UROBILINOGEN UR STRIP.AUTO-MCNC: 1 MG/DL
WBC # BLD AUTO: 7.4 X10(3) UL (ref 4–11)

## 2025-05-09 PROCEDURE — 71045 X-RAY EXAM CHEST 1 VIEW: CPT | Performed by: EMERGENCY MEDICINE

## 2025-05-09 PROCEDURE — 99223 1ST HOSP IP/OBS HIGH 75: CPT | Performed by: HOSPITALIST

## 2025-05-09 RX ORDER — IBUPROFEN 600 MG/1
600 TABLET, FILM COATED ORAL EVERY 4 HOURS PRN
Status: DISCONTINUED | OUTPATIENT
Start: 2025-05-09 | End: 2025-05-12

## 2025-05-09 RX ORDER — METOCLOPRAMIDE HYDROCHLORIDE 5 MG/ML
5 INJECTION INTRAMUSCULAR; INTRAVENOUS EVERY 8 HOURS PRN
Status: DISCONTINUED | OUTPATIENT
Start: 2025-05-09 | End: 2025-05-12

## 2025-05-09 RX ORDER — IBUPROFEN 400 MG/1
400 TABLET, FILM COATED ORAL EVERY 4 HOURS PRN
Status: DISCONTINUED | OUTPATIENT
Start: 2025-05-09 | End: 2025-05-12

## 2025-05-09 RX ORDER — SODIUM PHOSPHATE, DIBASIC AND SODIUM PHOSPHATE, MONOBASIC 7; 19 G/230ML; G/230ML
1 ENEMA RECTAL ONCE AS NEEDED
Status: DISCONTINUED | OUTPATIENT
Start: 2025-05-09 | End: 2025-05-12

## 2025-05-09 RX ORDER — IBUPROFEN 200 MG
200 TABLET ORAL EVERY 4 HOURS PRN
Status: DISCONTINUED | OUTPATIENT
Start: 2025-05-09 | End: 2025-05-12

## 2025-05-09 RX ORDER — ECHINACEA PURPUREA EXTRACT 125 MG
1 TABLET ORAL
Status: DISCONTINUED | OUTPATIENT
Start: 2025-05-09 | End: 2025-05-12

## 2025-05-09 RX ORDER — SENNOSIDES 8.6 MG
17.2 TABLET ORAL NIGHTLY PRN
Status: DISCONTINUED | OUTPATIENT
Start: 2025-05-09 | End: 2025-05-12

## 2025-05-09 RX ORDER — POLYETHYLENE GLYCOL 3350 17 G/17G
17 POWDER, FOR SOLUTION ORAL DAILY PRN
Status: DISCONTINUED | OUTPATIENT
Start: 2025-05-09 | End: 2025-05-12

## 2025-05-09 RX ORDER — ACETAMINOPHEN 500 MG
500 TABLET ORAL EVERY 4 HOURS PRN
Status: DISCONTINUED | OUTPATIENT
Start: 2025-05-09 | End: 2025-05-12

## 2025-05-09 RX ORDER — SODIUM CHLORIDE 9 MG/ML
INJECTION, SOLUTION INTRAVENOUS CONTINUOUS
Status: ACTIVE | OUTPATIENT
Start: 2025-05-09 | End: 2025-05-09

## 2025-05-09 RX ORDER — POTASSIUM CHLORIDE 1500 MG/1
40 TABLET, EXTENDED RELEASE ORAL ONCE
Status: COMPLETED | OUTPATIENT
Start: 2025-05-09 | End: 2025-05-09

## 2025-05-09 RX ORDER — ENOXAPARIN SODIUM 100 MG/ML
40 INJECTION SUBCUTANEOUS DAILY
Status: DISCONTINUED | OUTPATIENT
Start: 2025-05-09 | End: 2025-05-12

## 2025-05-09 RX ORDER — ONDANSETRON 2 MG/ML
4 INJECTION INTRAMUSCULAR; INTRAVENOUS EVERY 4 HOURS PRN
Status: ACTIVE | OUTPATIENT
Start: 2025-05-09 | End: 2025-05-09

## 2025-05-09 RX ORDER — ONDANSETRON 2 MG/ML
4 INJECTION INTRAMUSCULAR; INTRAVENOUS EVERY 6 HOURS PRN
Status: DISCONTINUED | OUTPATIENT
Start: 2025-05-09 | End: 2025-05-12

## 2025-05-09 RX ORDER — ACETAMINOPHEN 500 MG
1000 TABLET ORAL ONCE
Status: COMPLETED | OUTPATIENT
Start: 2025-05-09 | End: 2025-05-09

## 2025-05-09 RX ORDER — SODIUM CHLORIDE 9 MG/ML
INJECTION, SOLUTION INTRAVENOUS CONTINUOUS
Status: DISCONTINUED | OUTPATIENT
Start: 2025-05-09 | End: 2025-05-12

## 2025-05-09 RX ORDER — BISACODYL 10 MG
10 SUPPOSITORY, RECTAL RECTAL
Status: DISCONTINUED | OUTPATIENT
Start: 2025-05-09 | End: 2025-05-12

## 2025-05-09 NOTE — ED PROVIDER NOTES
Patient Seen in: [facility] [place]      History     Chief Complaint   Patient presents with    Dehydration     Stated Complaint: Elevated B/P and dehydration due to nausea/vomiting since Monday    Subjective:   HPI    79-year-old with a history of hypertension GERD and hyperlipidemia presents to the emergency department with nausea and vomiting since Monday concerned that she is dehydrated she arrives with a temperature of 104 degrees and a blood pressure of 192/88 she was sent over from her primary care doctor's office  History of Present Illness               Objective:     Past Medical History:    Arthritis    Degenerative disc disease, thoracic    Noted on CXR from 9/2017    Essential hypertension    Gastroesophageal reflux disease    Gross hematuria    Hematuria    High cholesterol    Hyperglycemia    MVP (mitral valve prolapse)    Osteoporosis    Other and unspecified hyperlipidemia    Partial intestinal obstruction (HCC)    PVC (premature ventricular contraction)              Past Surgical History:   Procedure Laterality Date    Colonoscopy N/A 1/20/2021    Procedure: COLONOSCOPY;  Surgeon: Elías Bowling MD;  Location:  ENDOSCOPY    Cystoscopy,+ureteroscopy Right 4/17/17    B RPG, R URS, no stone, stent placed, Dr. Adrián SPICER    Hysterectomy      Oophorectomy      Total abdom hysterectomy                  Social History     Socioeconomic History    Marital status:    Occupational History    Occupation: Retired    Tobacco Use    Smoking status: Never    Smokeless tobacco: Never   Vaping Use    Vaping status: Never Used   Substance and Sexual Activity    Alcohol use: Not Currently     Alcohol/week: 1.0 standard drink of alcohol     Types: 1 Standard drinks or equivalent per week    Drug use: No   Other Topics Concern    Caffeine Concern No     Comment: one cup weekly    Stress Concern No    Exercise No     Comment: house work    Seat Belt Yes    Self-Exams Yes     Comment: self  breast exam with showering     Social Drivers of Health     Food Insecurity: No Food Insecurity (5/9/2025)    NCSS - Food Insecurity     Worried About Running Out of Food in the Last Year: No     Ran Out of Food in the Last Year: No   Transportation Needs: No Transportation Needs (5/9/2025)    NCSS - Transportation     Lack of Transportation: No   Housing Stability: Not At Risk (5/9/2025)    NCSS - Housing/Utilities     Has Housing: Yes     Worried About Losing Housing: No     Unable to Get Utilities: No                                Physical Exam     ED Triage Vitals   BP 05/09/25 1338 (!) 192/88   Pulse 05/09/25 1338 96   Resp 05/09/25 1338 18   Temp 05/09/25 1340 (!) 104 °F (40 °C)   Temp src 05/09/25 1340 Oral   SpO2 05/09/25 1338 98 %   O2 Device 05/09/25 1338 None (Room air)       Current Vitals:   Vital Signs  BP: 140/76  Pulse: 61  Resp: 16  Temp: 99 °F (37.2 °C)  Temp src: Oral  MAP (mmHg): 92    Oxygen Therapy  SpO2: 97 %  O2 Device: None (Room air)  Pulse Oximetry Type: Intermittent  Oximetry Probe Site Changed: No  Pulse Ox Probe Location: Left hand        Physical Exam  Vitals and nursing note reviewed.   Constitutional:       General: She is not in acute distress.     Appearance: She is well-developed. She is not diaphoretic.      Comments: Very tired appearing but speaking full sentences, she was easily aroused and not cachectic she does not appear toxic   HENT:      Head: Atraumatic.      Right Ear: External ear normal.      Left Ear: External ear normal.      Nose: Nose normal.      Mouth/Throat:      Mouth: Mucous membranes are dry.   Eyes:      General: No scleral icterus.        Right eye: No discharge.         Left eye: No discharge.      Conjunctiva/sclera: Conjunctivae normal.      Pupils: Pupils are equal, round, and reactive to light.   Neck:      Vascular: No JVD.   Cardiovascular:      Rate and Rhythm: Regular rhythm. Tachycardia present.      Heart sounds: Normal heart sounds. No murmur  heard.     No friction rub. No gallop.   Pulmonary:      Effort: Pulmonary effort is normal. No respiratory distress.      Breath sounds: Normal breath sounds. No stridor. No wheezing.   Chest:      Chest wall: No tenderness.   Abdominal:      General: Bowel sounds are normal. There is no distension.      Palpations: Abdomen is soft.      Tenderness: There is no abdominal tenderness. There is no guarding or rebound.   Musculoskeletal:         General: No tenderness or deformity. Normal range of motion.      Cervical back: Normal range of motion and neck supple.   Lymphadenopathy:      Cervical: No cervical adenopathy.   Skin:     General: Skin is warm and dry.      Coloration: Skin is not pale.      Findings: No erythema or rash.   Neurological:      Mental Status: She is alert and oriented to person, place, and time.      Cranial Nerves: No cranial nerve deficit.      Coordination: Coordination normal.   Psychiatric:         Behavior: Behavior normal.         Judgment: Judgment normal.           Physical Exam                ED Course     Labs Reviewed   CBC WITH DIFFERENTIAL WITH PLATELET - Abnormal; Notable for the following components:       Result Value    Lymphocyte Absolute 0.33 (*)     Monocyte Absolute 0.07 (*)     All other components within normal limits   COMP METABOLIC PANEL (14) - Abnormal; Notable for the following components:    Glucose 148 (*)     Sodium 131 (*)     Chloride 97 (*)     Calculated Osmolality 274 (*)     AST 60 (*)     ALT 56 (*)     All other components within normal limits   LACTIC ACID, PLASMA - Abnormal; Notable for the following components:    Lactic Acid 2.2 (*)     All other components within normal limits   URINALYSIS WITH CULTURE REFLEX - Abnormal; Notable for the following components:    Blood Urine Large (*)     Protein Urine Trace (*)     Urobilinogen Urine 1.0 (*)     All other components within normal limits   UA MICROSCOPIC ONLY, URINE - Abnormal; Notable for the  following components:    RBC Urine >10 (*)     Bacteria Urine Rare (*)     Squamous Epi. Cells Few (*)     All other components within normal limits   CBC, PLATELET; NO DIFFERENTIAL - Abnormal; Notable for the following components:    RBC 3.78 (*)     HGB 11.4 (*)     HCT 33.4 (*)     All other components within normal limits   COMP METABOLIC PANEL (14) - Abnormal; Notable for the following components:    Glucose 113 (*)     Calcium, Total 8.1 (*)     AST 43 (*)     All other components within normal limits   BLOOD CULTURE - Abnormal; Notable for the following components:    Blood Culture Result Escherichia coli (*)     Blood Culture Result Klebsiella oxytoca (*)     Blood Smear Positive Blood Culture (*)     Blood Smear Gram Negative Rods (*)     All other components within normal limits    Narrative:     Anaerobic Bottle Volume - 12 mL    Anaerobic Bottle Time to Detection - 6 hr  42 min   Aerobic Bottle Volume - 10 mL    Aerobic Bottle Time to Detection - 7 hr  6 min      BLOOD CULTURE - Abnormal; Notable for the following components:    Blood Culture Result Escherichia coli (*)     Blood Culture Result Klebsiella oxytoca (*)     Blood Smear Positive Blood Culture (*)     Blood Smear Gram Negative Rods (*)     All other components within normal limits    Narrative:     Anaerobic Bottle Volume - 9 mL    Anaerobic Bottle Time to Detection - 7 hr   Aerobic Bottle Volume - 9 mL    Aerobic Bottle Time to Detection - 7 hr  18 min      BLD CULT ID BY PCR - Abnormal; Notable for the following components:    Escherichia coli by PCR Detected (*)     Klebsiella oxytoca by PCR Detected (*)     All other components within normal limits   LACTIC ACID REFLEX POST POSTIVE - Normal   POTASSIUM - Normal   POCT FLU TEST - Normal    Narrative:     This assay is a rapid molecular in vitro test utilizing nucleic acid amplification of influenza A and B viral RNA.   RAPID SARS-COV-2 BY PCR - Normal   SARS-COV-2/FLU A AND B/RSV BY PCR  ([employer]) - Normal    Narrative:     This test is intended for the qualitative detection and differentiation of SARS-CoV-2, influenza A, influenza B, and respiratory syncytial virus (RSV) viral RNA in nasopharyngeal or nares swabs from individuals suspected of respiratory viral infection consistent with COVID-19 by their healthcare provider. Signs and symptoms of respiratory viral infection due to SARS-CoV-2, influenza, and RSV can be similar.    Test performed using the Xpert Xpress SARS-CoV-2/FLU/RSV (real time RT-PCR)  assay on the GeneXpert instrument, [employer], [place] [address].   This test is being used under the Food and Drug Administration's Emergency Use Authorization.    The authorized Fact Sheet for Healthcare Providers for this assay is available upon request from the laboratory.   RAINBOW DRAW LAVENDER   RAINBOW DRAW LIGHT GREEN   RAINBOW DRAW BLUE   BLOOD CULTURE    Narrative:     Aerobic Bottle Volume - 10 mL  Anaerobic Bottle Volume - 10 mL   BLOOD CULTURE    Narrative:     Aerobic Bottle Volume - 13 mL  Anaerobic Bottle Volume - 13 mL       ED Course as of 05/12/25 1159  ------------------------------------------------------------  Time: 05/09 1558  Value: LACTIC ACID(!): 2.2  Comment: (Reviewed)  ------------------------------------------------------------  Time: 05/09 1558  Value: Bacteria Urine(!): Rare  Comment: (Reviewed)  ------------------------------------------------------------  Time: 05/09 1558  Value: WBC: 7.4  Comment: (Reviewed)  ------------------------------------------------------------  Time: 05/09 0518  Value: Sodium(!): 131  Comment: (Reviewed)     Results            Discussion with the patient and son that this patient is 79 years old and has a fever of 104 degrees.  Given that information I worry that she could certainly have bacteremia.  While she has had nausea and vomiting she indicated that it was for 1 day son indicated that it was for the past 4.  Therefore we have to  assume because nobody knew she was febrile that has been going on for an extended period of time.  Mildly elevated lactic acid and decreased sodium on labs, chest x-ray shows no signs of acute pneumonia.  Viral testing that we can actually do at this facility is negative.               MDM      Differential diagnosis includes but is not limited to urinary tract infection, bacteremia, COVID, flu, underlying significant infection, viral gastroenteritis, bacterial gastroenteritis    Patient afebrile and feeling much better after just fluids and Tylenol but I gave her a dose of ceftriaxone and I told the family that I was very uncomfortable with her going home because if you are barely see anyone he is 79 years old actually have a temperature of 104 degrees and not be significantly ill    Admission disposition: 5/9/2025  3:59 PM           Medical Decision Making      Disposition and Plan     Clinical Impression:  1. Fever, unspecified fever cause    2. Nausea vomiting and diarrhea    3. Hyponatremia    4. Lactic acidosis         Disposition:  Admit  5/9/2025  3:59 pm    Follow-up:  Lalo Herrera MD  [address] [place] [address]  [facility] [id number] [phone]    Follow up in 1 week(s)  Follow up    Bartolo Macias MD  [address] [place] [address] 3  [place] [address]  [phone]    Schedule an appointment as soon as possible for a visit on 5/28/2025            Medications Prescribed:  Current Discharge Medication List        START taking these medications    Details   metroNIDAZOLE 500 MG Oral Tab Take 1 tablet (500 mg total) by mouth 2 (two) times daily for 14 days.  Qty: 28 tablet, Refills: 0      levoFLOXacin 500 MG Oral Tab Take 1 tablet (500 mg total) by mouth daily for 14 days.  Qty: 14 tablet, Refills: 0             Supplementary Documentation:     [facility] [place]      Sepsis Reassessment Note    BP (!) 192/88   Pulse 96   Temp (!) 104 °F (40 °C) (Oral)   Resp 18   Ht 152.4 cm (5')    Wt 73 kg   SpO2 98%   BMI 31.44 kg/m²      I completed the sepsis reassessment at 1629    Cardiac:  Regularity: Regular  Rate: Normal  Heart Sounds: S1,S2    Lungs:   Right: Clear  Left: Clear    Peripheral Pulses:  Radial: Right 1+ or Left 1+      Capillary Refill:  <3 Secs    Skin:  Temp/Moisture: Warm and Dry  Color: Normal      Diana Gutierrez MD  5/9/2025  2:08 PM             Hospital Problems       Present on Admission  Date Reviewed: 5/9/2025          ICD-10-CM Noted POA    * (Principal) Fever, unspecified fever cause R50.9 5/9/2025 Unknown    Hyperglycemia R73.9 5/9/2025 Yes    Hyponatremia E87.1 5/9/2025 Unknown    Lactic acidosis E87.20 5/9/2025 Unknown    Liver abscess (HCC) K75.0 5/10/2025 Unknown    Nausea vomiting and diarrhea R11.2, R19.7 5/9/2025 Unknown    Septicemia (HCC) A41.9 5/10/2025 Unknown

## 2025-05-09 NOTE — H&P
[facility]  History and Physical     Kylah Zarco Patient Status:  Emergency    1945 MRN [id number]   Location [facility] [place] Attending Diana Gutierrez MD   Hosp Day # 0 PCP Lalo Herrera MD     Chief Complaint: Fever of 102 chills generalized weakness fatigue poor p.o. intake nausea vomiting    Subjective:    History of Present Illness:     Kylah Zarco is a 79 year old female with history of hypertension hyperlipidemia presents to her primary care physician's office to be evaluated for fever chills generalized weakness fatigue.  Upon coming into emergency room where she was referred by her primary care physician temperature was 104 blood pressure 192/88 pulse ox 98% on room air at rest.  WBC is normal today her sodium is 131 AST 60 ALT 56 and lactic acid 2.2.  Chest x-ray no evidence of pneumonia and UA with hematuria.  Blood cultures sent from emergency room and will be followed.  Patient received empiric antibiotics ceftriaxone 2 g IV.    History/Other:    Past Medical History:  Past Medical History[1]  Past Surgical History:   Past Surgical History[2]   Family History:   Family History[3]  Social History:    reports that she has never smoked. She has never used smokeless tobacco. She reports that she does not currently use alcohol after a past usage of about 1.0 standard drink of alcohol per week. She reports that she does not use drugs.     Allergies: Allergies[4]    Medications:  Medications Ordered Prior to Encounter[5]    Review of Systems:   A comprehensive review of systems was completed.    Pertinent positives and negatives noted in the HPI.    Objective:   Physical Exam:    /49   Pulse 75   Temp 98.7 °F (37.1 °C) (Oral)   Resp 17   Ht 5' (1.524 m)   Wt 161 lb (73 kg)   SpO2 96%   BMI 31.44 kg/m²   General: No acute distress, Alert  Respiratory: No rhonchi, no wheezes  Cardiovascular: S1, S2. Regular rate and rhythm  Abdomen:  Soft, Non-tender, non-distended, positive bowel sounds  Neuro: No new focal deficits  Extremities: No edema      Results:    Labs:      Labs Last 24 Hours:    Recent Labs   Lab 05/09/25  1343   RBC 4.51   HGB 13.5   HCT 41.2   MCV 91.4   MCH 29.9   MCHC 32.8   RDW 12.9   NEPRELIM 6.92   WBC 7.4   .0       Recent Labs   Lab 05/09/25  1343   *   BUN 11   CREATSERUM 0.82   EGFRCR 72   CA 8.9   ALB 4.6   *   K 3.7   CL 97*   CO2 28.0   ALKPHO 102   AST 60*   ALT 56*   BILT 0.8   TP 7.3       Estimated Glomerular Filtration Rate: 72 mL/min/1.73m2 (result from lab).    Lab Results   Component Value Date    PT 12.8 01/29/2011    INR 1.01 01/20/2021    INR 0.95 01/29/2011       No results for input(s): \"TROP\", \"TROPHS\", \"CK\" in the last 168 hours.    No results for input(s): \"TROP\", \"PBNP\" in the last 168 hours.    No results for input(s): \"PCT\" in the last 168 hours.    Imaging: Imaging data reviewed in Epic.    Assessment & Plan:      # 79 years old female presents with fever chills generalized weakness fatigue  - Patient denies cough shortness of breath abdominal pain dysuria frequency  - Previous imaging reviewed and patient has evidence of sigmoid diverticulosis without any abdominal pain  - Patient denies diarrhea  - Will continue to monitor clinically overnight and reevaluate in the morning  - Patient may benefit from CT abdomen  - Empiric antibiotics started in the emergency room    # Hypertension  - Stable    # Dehydration  -continue ivfluids        Plan of care discussed with patient her son and ED physician    Diane Medina MD    Supplementary Documentation:     The 21st Century Cures Act makes medical notes like these available to patients in the interest of transparency. Please be advised this is a medical document. Medical documents are intended to carry relevant information, facts as evident, and the clinical opinion of the practitioner. The medical note is intended as peer to peer  communication and may appear blunt or direct. It is written in medical language and may contain abbreviations or verbiage that are unfamiliar.                                       [1]   Past Medical History:   Arthritis    Degenerative disc disease, thoracic    Noted on CXR from 9/2017    Essential hypertension    Gastroesophageal reflux disease    Gross hematuria    Hematuria    High cholesterol    Hyperglycemia    MVP (mitral valve prolapse)    Osteoporosis    Other and unspecified hyperlipidemia    Partial intestinal obstruction (HCC)    PVC (premature ventricular contraction)   [2]   Past Surgical History:  Procedure Laterality Date    Colonoscopy N/A 1/20/2021    Procedure: COLONOSCOPY;  Surgeon: Elías Bowling MD;  Location:  ENDOSCOPY    Cystoscopy,+ureteroscopy Right 4/17/17    B RPG, R URS, no stone, stent placed, Dr. Adrián SPICER    Hysterectomy      Oophorectomy      Total abdom hysterectomy     [3] No family history on file.  [4] No Known Allergies  [5]   No current facility-administered medications on file prior to encounter.     Current Outpatient Medications on File Prior to Encounter   Medication Sig Dispense Refill    triamcinolone 0.1 % External Cream Aplica un poquito de crema en el area afectada cada connor para 7 frank, y despues ahmet vez a la semana bertha se necesite 60 g 0    diclofenac (VOLTAREN) 1 % External Gel Apply 4 g topically 4 (four) times daily. 20 g 0    vitamin E 400 UNITS Oral Cap Take 1,000 Units by mouth daily.

## 2025-05-09 NOTE — ED QUICK NOTES
Orders for admission, patient is aware of plan and ready to go upstairs. Any questions, please call ED RN Carley at extension [phone].     Patient Covid vaccination status: Fully vaccinated     COVID Test Ordered in ED: SARS-CoV-2/Flu A and B/RSV by PCR (GeneXpert)    COVID Suspicion at Admission: N/A    Running Infusions: Medication Infusions[1] None    Mental Status/LOC at time of transport: A&OX3      Other pertinent information:   CIWA score: N/A   NIH score:  N/A             [1]

## 2025-05-09 NOTE — PROGRESS NOTES
Subjective:   Patient ID: Kylah Zarco is a 79 year old female.    Nausea  This is a new problem. The current episode started in the past 7 days. The problem occurs 2 to 4 times per day. The problem has been gradually worsening. Associated symptoms include anorexia, chills, fatigue, a fever (102 F oral, better with NSAIDs but returns), nausea, vomiting (bile, 2-3 times/day) and weakness. Pertinent negatives include no abdominal pain, arthralgias, change in bowel habit, chest pain, congestion, coughing, headaches, joint swelling, myalgias, neck pain, numbness, sore throat, swollen glands, urinary symptoms or vertigo. Nothing aggravates the symptoms. She has tried rest for the symptoms. The treatment provided no relief.     BP noted elevated at home; no palpitations  History/Other:   Review of Systems   Constitutional:  Positive for chills, fatigue and fever (102 F oral, better with NSAIDs but returns).   HENT:  Negative for congestion and sore throat.    Respiratory:  Negative for cough.    Cardiovascular:  Negative for chest pain.   Gastrointestinal:  Positive for anorexia, nausea and vomiting (bile, 2-3 times/day). Negative for abdominal pain and change in bowel habit.   Musculoskeletal:  Negative for arthralgias, joint swelling, myalgias and neck pain.   Neurological:  Positive for weakness. Negative for vertigo, numbness and headaches.   All other systems reviewed and are negative.    Current Medications[1]  Allergies:Allergies[2]    Objective:   Physical Exam  Vitals and nursing note reviewed.   Constitutional:       Appearance: She is ill-appearing. She is not toxic-appearing or diaphoretic.   HENT:      Head: Normocephalic and atraumatic.      Nose: Nose normal.      Mouth/Throat:      Mouth: Mucous membranes are moist.   Cardiovascular:      Rate and Rhythm: Normal rate and regular rhythm.      Pulses: Normal pulses.      Heart sounds: Normal heart sounds. No murmur heard.  Pulmonary:      Effort:  Pulmonary effort is normal.      Breath sounds: Normal breath sounds.   Abdominal:      General: Abdomen is flat. Bowel sounds are normal.      Palpations: Abdomen is soft.      Tenderness: There is abdominal tenderness.   Musculoskeletal:      Right lower leg: No edema.      Left lower leg: No edema.   Skin:     Findings: No rash.   Neurological:      Mental Status: She is alert.         Assessment & Plan:   1. Nausea    2. Elevated blood pressure reading    3. Diarrhea of presumed infectious origin    4. Acute viral syndrome      Pt sent to ER for eval as I suspect she is dehydrated and will need labs for eval. Son will take her to [facility] for eval  No orders of the defined types were placed in this encounter.      Meds This Visit:  Requested Prescriptions      No prescriptions requested or ordered in this encounter       Imaging & Referrals:  None         [1]   Current Outpatient Medications   Medication Sig Dispense Refill    triamcinolone 0.1 % External Cream Aplica un poquito de crema en el area afectada cada connor para 7 frank, y despues ahmet vez a la semana bertha se necesite 60 g 0    diclofenac (VOLTAREN) 1 % External Gel Apply 4 g topically 4 (four) times daily. 20 g 0    vitamin E 400 UNITS Oral Cap Take 1,000 Units by mouth daily.       [2] No Known Allergies

## 2025-05-10 ENCOUNTER — APPOINTMENT (OUTPATIENT)
Dept: CT IMAGING | Facility: HOSPITAL | Age: 80
End: 2025-05-10
Attending: INTERNAL MEDICINE
Payer: MEDICARE

## 2025-05-10 PROBLEM — K75.0 LIVER ABSCESS (HCC): Status: ACTIVE | Noted: 2025-05-10

## 2025-05-10 PROBLEM — A41.9 SEPTICEMIA (HCC): Status: ACTIVE | Noted: 2025-05-10

## 2025-05-10 LAB
ALBUMIN SERPL-MCNC: 3.7 G/DL (ref 3.2–4.8)
ALBUMIN/GLOB SERPL: 1.7 {RATIO} (ref 1–2)
ALP LIVER SERPL-CCNC: 73 U/L (ref 55–142)
ALT SERPL-CCNC: 41 U/L (ref 10–49)
ANION GAP SERPL CALC-SCNC: 7 MMOL/L (ref 0–18)
AST SERPL-CCNC: 43 U/L (ref ?–34)
BILIRUB SERPL-MCNC: 0.4 MG/DL (ref 0.2–1.1)
BUN BLD-MCNC: 9 MG/DL (ref 9–23)
CALCIUM BLD-MCNC: 8.1 MG/DL (ref 8.7–10.6)
CHLORIDE SERPL-SCNC: 107 MMOL/L (ref 98–112)
CO2 SERPL-SCNC: 23 MMOL/L (ref 21–32)
CREAT BLD-MCNC: 0.67 MG/DL (ref 0.55–1.02)
EGFRCR SERPLBLD CKD-EPI 2021: 88 ML/MIN/1.73M2 (ref 60–?)
ERYTHROCYTE [DISTWIDTH] IN BLOOD BY AUTOMATED COUNT: 13.1 %
GLOBULIN PLAS-MCNC: 2.2 G/DL (ref 2–3.5)
GLUCOSE BLD-MCNC: 113 MG/DL (ref 70–99)
HCT VFR BLD AUTO: 33.4 % (ref 35–48)
HGB BLD-MCNC: 11.4 G/DL (ref 12–16)
MCH RBC QN AUTO: 30.2 PG (ref 26–34)
MCHC RBC AUTO-ENTMCNC: 34.1 G/DL (ref 31–37)
MCV RBC AUTO: 88.4 FL (ref 80–100)
OSMOLALITY SERPL CALC.SUM OF ELEC: 283 MOSM/KG (ref 275–295)
PLATELET # BLD AUTO: 171 10(3)UL (ref 150–450)
POTASSIUM SERPL-SCNC: 3.7 MMOL/L (ref 3.5–5.1)
POTASSIUM SERPL-SCNC: 3.7 MMOL/L (ref 3.5–5.1)
PROT SERPL-MCNC: 5.9 G/DL (ref 5.7–8.2)
RBC # BLD AUTO: 3.78 X10(6)UL (ref 3.8–5.3)
SODIUM SERPL-SCNC: 137 MMOL/L (ref 136–145)
WBC # BLD AUTO: 9.9 X10(3) UL (ref 4–11)

## 2025-05-10 PROCEDURE — 74150 CT ABDOMEN W/O CONTRAST: CPT | Performed by: INTERNAL MEDICINE

## 2025-05-10 PROCEDURE — 99233 SBSQ HOSP IP/OBS HIGH 50: CPT | Performed by: HOSPITALIST

## 2025-05-10 PROCEDURE — 74177 CT ABD & PELVIS W/CONTRAST: CPT | Performed by: INTERNAL MEDICINE

## 2025-05-10 RX ORDER — MIDAZOLAM HYDROCHLORIDE 1 MG/ML
1 INJECTION INTRAMUSCULAR; INTRAVENOUS EVERY 5 MIN PRN
Status: DISCONTINUED | OUTPATIENT
Start: 2025-05-10 | End: 2025-05-10 | Stop reason: HOSPADM

## 2025-05-10 RX ORDER — NALOXONE HYDROCHLORIDE 0.4 MG/ML
0.08 INJECTION, SOLUTION INTRAMUSCULAR; INTRAVENOUS; SUBCUTANEOUS AS NEEDED
Status: DISCONTINUED | OUTPATIENT
Start: 2025-05-10 | End: 2025-05-10 | Stop reason: HOSPADM

## 2025-05-10 RX ORDER — METRONIDAZOLE 500 MG/100ML
500 INJECTION, SOLUTION INTRAVENOUS EVERY 12 HOURS
Status: DISCONTINUED | OUTPATIENT
Start: 2025-05-10 | End: 2025-05-11

## 2025-05-10 RX ORDER — FLUMAZENIL 0.1 MG/ML
0.2 INJECTION INTRAVENOUS AS NEEDED
Status: DISCONTINUED | OUTPATIENT
Start: 2025-05-10 | End: 2025-05-10 | Stop reason: HOSPADM

## 2025-05-10 RX ORDER — SODIUM CHLORIDE 9 MG/ML
INJECTION, SOLUTION INTRAVENOUS CONTINUOUS
Status: DISCONTINUED | OUTPATIENT
Start: 2025-05-10 | End: 2025-05-10 | Stop reason: HOSPADM

## 2025-05-10 RX ORDER — MIDAZOLAM HYDROCHLORIDE 1 MG/ML
INJECTION INTRAMUSCULAR; INTRAVENOUS
Status: DISCONTINUED
Start: 2025-05-10 | End: 2025-05-10 | Stop reason: WASHOUT

## 2025-05-10 NOTE — IMAGING NOTE
Kylah is here for CT guided liver fluid aspiration. 0.9 NS IV initiated to maintain patency.    Informed consent obtained by Dr VENESSA Lopez. Patient positioned on scanner. Per Dr. Lopez after  imaging, procedure canceled.    Kylah awaiting transport back to room 407. SBAR called to Codie Ibrahim/Onc RN.

## 2025-05-10 NOTE — PROGRESS NOTES
[facility]   part of [facility]     Hospitalist Progress Note     Kylah Zarco Patient Status:  Inpatient    1945 MRN [id number]   Location [facility] 4NW-A Attending Keith Goldman MD   Hosp Day # 1 PCP Lalo Herrera MD     Subjective:   Feels little better     Objective:    Review of Systems:   A comprehensive review of systems was completed; pertinent positive and negatives stated in subjective.  Vital signs:  Temp:  [98 °F (36.7 °C)-104 °F (40 °C)] 99.2 °F (37.3 °C)  Pulse:  [61-99] 62  Resp:  [14-26] 14  BP: ()/(38-88) 161/52  SpO2:  [94 %-99 %] 99 %  Physical Exam:    General: No acute distress , Ill appearing    Respiratory: no wheezes, no rhonchi  Cardiovascular: S1, S2, RRR  Abdomen: Soft, NT/ND, +BS  Extremities: no edema    Diagnostic Data:    Labs:  Recent Labs   Lab 25  1343   WBC 7.4   HGB 13.5   MCV 91.4   .0     Recent Labs   Lab 25  1343 05/10/25  0609   *  --    BUN 11  --    CREATSERUM 0.82  --    CA 8.9  --    ALB 4.6  --    *  --    K 3.7 3.7   CL 97*  --    CO2 28.0  --    ALKPHO 102  --    AST 60*  --    ALT 56*  --    BILT 0.8  --    TP 7.3  --      Estimated Creatinine Clearance: 38.6 mL/min (based on SCr of 0.82 mg/dL).  No results for input(s): \"PTP\", \"INR\" in the last 168 hours.     Microbiology  Hospital Encounter on 25   1. Blood Culture     Status: Abnormal (Preliminary result)    Collection Time: 25  2:06 PM    Specimen: Blood,peripheral   Result Value Ref Range    Blood Culture Result Positive N/A    Blood Smear Positive Blood Culture (A) N/A    Blood Smear Gram Negative Rods (A) N/A     Imaging: Reviewed in Epic.  Medications: Scheduled Medications[1]    Assessment & Plan:    # Septicemia- GNR in blood. Lactic acidosis and elevated LFTs  - ID consult  - check CT abd- Heterogeneous low-attenuation structure within the right hepatic lobe measuring 1.5 x 1.6 cm with mild surrounding heterogeneous  enhancement.  Of note there was a 1 cm cyst in this region on prior CT of 8/25/2022.  Given interval increase in size and   developing heterogeneity superimposed infection/hepatic abscess is of consideration.   - Abx per ID   - recheck labs  -IR consulted to evaluate for Aspiration/drain of hepatic lesion     #Lactic acidosis- resolved          Supplementary Documentation:   Quality:  DVT Mechanical Prophylaxis:   SCDs, Early ambuation  DVT Pharmacologic Prophylaxis   Medication    enoxaparin (Lovenox) 100 MG/ML SUBQ injection 40 mg                Code Status: Not on file  Walker: No urinary catheter in place  Walker Duration (in days):   Central line:    LUIS:   At this point Ms. Zarco is expected to be discharge to: home     The 21st Century Cures Act makes medical notes like these available to patients in the interest of transparency. Please be advised this is a medical document. Medical documents are intended to carry relevant information, facts as evident, and the clinical opinion of the practitioner. The medical note is intended as peer to peer communication and may appear blunt or direct. It is written in medical language and may contain abbreviations or verbiage that are unfamiliar.       **Certification      PHYSICIAN Certification of Need for Inpatient Hospitalization - Initial Certification    Patient will require inpatient services that will reasonably be expected to span two midnight's based on the clinical documentation in H+P.   Based on patients current state of illness, I anticipate that, after discharge, patient will require TBD.         [1]    enoxaparin  40 mg Subcutaneous Daily

## 2025-05-10 NOTE — PLAN OF CARE
Patient is A/O x 4. VSS> Afebrile. Room air. No complaints of pain. Ambulatory. Voids. Regular diet; tolerated well. CT of A/P completed (see results). All medications given per MAR. IVF/ abx infusing per orders. Safety precautions in place. Call light within reach.  1900: Patient became febrile, prn tylenol given.  Problem: GASTROINTESTINAL - ADULT  Goal: Minimal or absence of nausea and vomiting  Description: INTERVENTIONS:- Maintain adequate hydration with IV or PO as ordered and tolerated- Nasogastric tube to low intermittent suction as ordered- Evaluate effectiveness of ordered antiemetic medications- Provide nonpharmacologic comfort measures as appropriate- Advance diet as tolerated, if ordered- Obtain nutritional consult as needed- Evaluate fluid balance  Outcome: Progressing  Goal: Maintains or returns to baseline bowel function  Description: INTERVENTIONS:- Assess bowel function- Maintain adequate hydration with IV or PO as ordered and tolerated- Evaluate effectiveness of GI medications- Encourage mobilization and activity- Obtain nutritional consult as needed- Establish a toileting routine/schedule- Consider collaborating with pharmacy to review patient's medication profile  Outcome: Progressing  Goal: Maintains adequate nutritional intake (undernourished)  Description: INTERVENTIONS:- Monitor percentage of each meal consumed- Identify factors contributing to decreased intake, treat as appropriate- Assist with meals as needed- Monitor I&O, WT and lab values- Obtain nutritional consult as needed- Optimize oral hygiene and moisture- Encourage food from home; allow for food preferences- Enhance eating environment  Outcome: Progressing

## 2025-05-10 NOTE — CONSULTS
INFECTIOUS DISEASE CONSULTATION    Kylah Zarco Patient Status:  Inpatient    1945 MRN [id number]   [facility] 4NW-A Attending Keith Goldman MD   Hosp Day # 1 PCP Lalo Herrera MD       Requested by Dr. Goldman    Reason for Consultation:  Klebsiella and E coli bacteremia     History of Present Illness:  Kylah Zarco is a a(n) 79 year old female reports was \"feeling lazy\", and developed epigastric abdominal pain and vomited. Fever at home of 102.  She went to Dr. Herrera office on  and sent to ED.     In ED temp of 104.0 and BP was elevated.   UA negative.  Blood cultures collected and received Ceftriaxone in ED.    Blood cultures collected in ED positive for GNR, E coli and Klebsiella by PCR, CTX-M not detected        History:  Past Medical History[1]  Past Surgical History[2]  Family History[3]   reports that she has never smoked. She has never used smokeless tobacco. She reports that she does not currently use alcohol after a past usage of about 1.0 standard drink of alcohol per week. She reports that she does not use drugs.      Allergies:  Allergies[4]    Medications:  Current Hospital Medications[5]  Medications Ordered Prior to Encounter[6]    Review of Systems:    A comprehensive 10 point review of systems was completed.  Pertinent positives and negatives noted in the the HPI.      Physical Exam:    General: No acute distress. Alert and oriented x 3.  Vital signs: Temp:  [98 °F (36.7 °C)-104 °F (40 °C)] 99.2 °F (37.3 °C)  Pulse:  [61-99] 62  Resp:  [14-26] 14  BP: ()/(38-88) 161/52  SpO2:  [94 %-99 %] 99 %  Body mass index is 25.39 kg/m².  HEENT: Moist mucous membranes. Extraocular muscles are intact.  Neck: No swelling, no masses  Respiratory: Non labored, symmetric exursion  Cardiovascular: No irregularities in rhythm  Abdomen: Soft, distended, non tender  Musculoskeletal: Full range of motion of all  extremities.  No swelling noted.  Joints: no effusions  Skin: No lesions. No erythema, no open wounds    Laboratory Data:  Laboratory data reviewed      Recent Labs   Lab 05/09/25  1343 05/10/25  0715   RBC 4.51 3.78*   HGB 13.5 11.4*   HCT 41.2 33.4*   MCV 91.4 88.4   MCH 29.9 30.2   MCHC 32.8 34.1   RDW 12.9 13.1   NEPRELIM 6.92  --    WBC 7.4 9.9   .0 171.0       Recent Labs   Lab 05/09/25  1343 05/10/25  0609   *  --    BUN 11  --    CREATSERUM 0.82  --    CA 8.9  --    ALB 4.6  --    *  --    K 3.7 3.7   CL 97*  --    CO2 28.0  --    ALKPHO 102  --    AST 60*  --    ALT 56*  --    BILT 0.8  --    TP 7.3  --                 Microbiologic Data:   Hospital Encounter on 05/09/25   1. Blood Culture     Status: Abnormal (Preliminary result)    Collection Time: 05/09/25  2:06 PM    Specimen: Blood,peripheral   Result Value Ref Range    Blood Culture Result Positive N/A    Blood Smear Positive Blood Culture (A) N/A    Blood Smear Gram Negative Rods (A) N/A     Escherichia coli by PCR Detected    Klebsiella oxytoca by PCR Detected     CTX-M (ESBL gene) by PCR   Not Detected        Established Problem list:  Problem List[7]    ASSESSMENT/PLAN:  1. E coli and Klebsiella bacteremia  -admitted with epigastric pain,vomiting,and fever that started day of admission  No abdominal pain or tenderness today  UA negative  Suspected intra-abdominal source    Continue Ceftriaxone, with the addition of metronidazole for anaerobic coverage  CT abd/pelvis with IV contrast           Bartolo Macias MD, MD  [facility]  [phone]         [1]   Past Medical History:   Arthritis    Degenerative disc disease, thoracic    Noted on CXR from 9/2017    Essential hypertension    Gastroesophageal reflux disease    Gross hematuria    Hematuria    High cholesterol    Hyperglycemia    MVP (mitral valve prolapse)    Osteoporosis    Other and unspecified hyperlipidemia    Partial intestinal  obstruction (HCC)    PVC (premature ventricular contraction)   [2]   Past Surgical History:  Procedure Laterality Date    Colonoscopy N/A 1/20/2021    Procedure: COLONOSCOPY;  Surgeon: Elías Bowling MD;  Location:  ENDOSCOPY    Cystoscopy,+ureteroscopy Right 4/17/17    B RPG, R URS, no stone, stent placed, Dr. Adirán SPICER    Hysterectomy      Oophorectomy      Total abdom hysterectomy     [3] No family history on file.  [4] No Known Allergies  [5]   Current Facility-Administered Medications:     piperacillin-tazobactam (Zosyn) 3.375 g in dextrose 5% 100 mL IVPB-ADDV, 3.375 g, Intravenous, Q8H    sodium chloride 0.9% infusion, , Intravenous, Continuous    enoxaparin (Lovenox) 100 MG/ML SUBQ injection 40 mg, 40 mg, Subcutaneous, Daily    acetaminophen (Tylenol Extra Strength) tab 500 mg, 500 mg, Oral, Q4H PRN    ibuprofen (Motrin) tab 200 mg, 200 mg, Oral, Q4H PRN **OR** ibuprofen (Motrin) tab 400 mg, 400 mg, Oral, Q4H PRN **OR** ibuprofen (Motrin) tab 600 mg, 600 mg, Oral, Q4H PRN    melatonin tab 3 mg, 3 mg, Oral, Nightly PRN    glycerin-hypromellose- (Artificial Tears) 0.2-0.2-1 % ophthalmic solution 1 drop, 1 drop, Both Eyes, QID PRN    sodium chloride (Saline Mist) 0.65 % nasal solution 1 spray, 1 spray, Each Nare, Q3H PRN    polyethylene glycol (PEG 3350) (Miralax) 17 g oral packet 17 g, 17 g, Oral, Daily PRN    sennosides (Senokot) tab 17.2 mg, 17.2 mg, Oral, Nightly PRN    bisacodyl (Dulcolax) 10 MG rectal suppository 10 mg, 10 mg, Rectal, Daily PRN    fleet enema (Fleet) rectal enema 133 mL, 1 enema, Rectal, Once PRN    ondansetron (Zofran) 4 MG/2ML injection 4 mg, 4 mg, Intravenous, Q6H PRN    metoclopramide (Reglan) 5 mg/mL injection 5 mg, 5 mg, Intravenous, Q8H PRN  [6]   No current facility-administered medications on file prior to encounter.     Current Outpatient Medications on File Prior to Encounter   Medication Sig Dispense Refill    triamcinolone 0.1 % External Cream Aplica un poquito de crema  en el area afectada cada connor para 7 frank, y despues ahmet vez a la semana bertha se necesite 60 g 0    diclofenac (VOLTAREN) 1 % External Gel Apply 4 g topically 4 (four) times daily. 20 g 0    vitamin E 400 UNITS Oral Cap Take 1,000 Units by mouth in the morning.     [7]   Patient Active Problem List  Diagnosis    Thyroid nodule    Microscopic hematuria    Influenza vaccination declined    Hyperglycemia    Fever, unspecified fever cause    Nausea vomiting and diarrhea    Hyponatremia    Lactic acidosis

## 2025-05-10 NOTE — PLAN OF CARE
Admitted from [place] ED with episodes of nausea, vomiting and weakness x 1-2 days prior to admission.  Patient is alert and oriented, denies pain, no nausea.  Started on regular diet, seen by hospitalist, family at bedside, reported off to the next shift.   Problem: GASTROINTESTINAL - ADULT  Goal: Minimal or absence of nausea and vomiting  Description: INTERVENTIONS:- Maintain adequate hydration with IV or PO as ordered and tolerated- Nasogastric tube to low intermittent suction as ordered- Evaluate effectiveness of ordered antiemetic medications- Provide nonpharmacologic comfort measures as appropriate- Advance diet as tolerated, if ordered- Obtain nutritional consult as needed- Evaluate fluid balance  Outcome: Progressing  Goal: Maintains adequate nutritional intake (undernourished)  Description: INTERVENTIONS:- Monitor percentage of each meal consumed- Identify factors contributing to decreased intake, treat as appropriate- Assist with meals as needed- Monitor I&O, WT and lab values- Obtain nutritional consult as needed- Optimize oral hygiene and moisture- Encourage food from home; allow for food preferences- Enhance eating environment  Outcome: Progressing

## 2025-05-10 NOTE — PLAN OF CARE
Pt received A&Ox4, Lib[place] speaking. VSS. RA. Tmax 99.3. Denies nausea/pain. Electrolyte K+ replaced. IVF. Call light within reach.     Problem: GASTROINTESTINAL - ADULT  Goal: Minimal or absence of nausea and vomiting  Description: INTERVENTIONS:- Maintain adequate hydration with IV or PO as ordered and tolerated- Nasogastric tube to low intermittent suction as ordered- Evaluate effectiveness of ordered antiemetic medications- Provide nonpharmacologic comfort measures as appropriate- Advance diet as tolerated, if ordered- Obtain nutritional consult as needed- Evaluate fluid balance  Outcome: Progressing  Goal: Maintains or returns to baseline bowel function  Description: INTERVENTIONS:- Assess bowel function- Maintain adequate hydration with IV or PO as ordered and tolerated- Evaluate effectiveness of GI medications- Encourage mobilization and activity- Obtain nutritional consult as needed- Establish a toileting routine/schedule- Consider collaborating with pharmacy to review patient's medication profile  Outcome: Progressing  Goal: Maintains adequate nutritional intake (undernourished)  Description: INTERVENTIONS:- Monitor percentage of each meal consumed- Identify factors contributing to decreased intake, treat as appropriate- Assist with meals as needed- Monitor I&O, WT and lab values- Obtain nutritional consult as needed- Optimize oral hygiene and moisture- Encourage food from home; allow for food preferences- Enhance eating environment  Outcome: Progressing

## 2025-05-11 PROCEDURE — 99232 SBSQ HOSP IP/OBS MODERATE 35: CPT | Performed by: HOSPITALIST

## 2025-05-11 RX ORDER — METRONIDAZOLE 500 MG/1
500 TABLET ORAL 2 TIMES DAILY
Status: DISCONTINUED | OUTPATIENT
Start: 2025-05-11 | End: 2025-05-12

## 2025-05-11 NOTE — PLAN OF CARE
Patient is A/O x 4. VSS.  Afebrile. Room air. No complaints of pain. Ambulatory. Voids. Regular diet; liquids encouraged. . All medications given per MAR. IVF infusing per orders. Safety precautions in place. Call light within reach.  Problem: GASTROINTESTINAL - ADULT  Goal: Minimal or absence of nausea and vomiting  Description: INTERVENTIONS:- Maintain adequate hydration with IV or PO as ordered and tolerated- Nasogastric tube to low intermittent suction as ordered- Evaluate effectiveness of ordered antiemetic medications- Provide nonpharmacologic comfort measures as appropriate- Advance diet as tolerated, if ordered- Obtain nutritional consult as needed- Evaluate fluid balance  5/11/2025 1149 by Codie Araujo RN  Outcome: Progressing  5/11/2025 1149 by Codie Araujo RN  Outcome: Progressing  Goal: Maintains or returns to baseline bowel function  Description: INTERVENTIONS:- Assess bowel function- Maintain adequate hydration with IV or PO as ordered and tolerated- Evaluate effectiveness of GI medications- Encourage mobilization and activity- Obtain nutritional consult as needed- Establish a toileting routine/schedule- Consider collaborating with pharmacy to review patient's medication profile  5/11/2025 1149 by Codie Araujo RN  Outcome: Progressing  5/11/2025 1149 by Codie Araujo RN  Outcome: Progressing  Goal: Maintains adequate nutritional intake (undernourished)  Description: INTERVENTIONS:- Monitor percentage of each meal consumed- Identify factors contributing to decreased intake, treat as appropriate- Assist with meals as needed- Monitor I&O, WT and lab values- Obtain nutritional consult as needed- Optimize oral hygiene and moisture- Encourage food from home; allow for food preferences- Enhance eating environment  5/11/2025 1149 by Codie Araujo RN  Outcome: Progressing  5/11/2025 1149 by Codie Araujo, RN  Outcome: Progressing

## 2025-05-11 NOTE — PLAN OF CARE
Pt received A&Ox4. VSS. RA. Medications given per MAR, receiving Flagyl & Rocephin. IVF. Call light within reach.     Problem: GASTROINTESTINAL - ADULT  Goal: Minimal or absence of nausea and vomiting  Description: INTERVENTIONS:- Maintain adequate hydration with IV or PO as ordered and tolerated- Nasogastric tube to low intermittent suction as ordered- Evaluate effectiveness of ordered antiemetic medications- Provide nonpharmacologic comfort measures as appropriate- Advance diet as tolerated, if ordered- Obtain nutritional consult as needed- Evaluate fluid balance  Outcome: Progressing  Goal: Maintains or returns to baseline bowel function  Description: INTERVENTIONS:- Assess bowel function- Maintain adequate hydration with IV or PO as ordered and tolerated- Evaluate effectiveness of GI medications- Encourage mobilization and activity- Obtain nutritional consult as needed- Establish a toileting routine/schedule- Consider collaborating with pharmacy to review patient's medication profile  Outcome: Progressing  Goal: Maintains adequate nutritional intake (undernourished)  Description: INTERVENTIONS:- Monitor percentage of each meal consumed- Identify factors contributing to decreased intake, treat as appropriate- Assist with meals as needed- Monitor I&O, WT and lab values- Obtain nutritional consult as needed- Optimize oral hygiene and moisture- Encourage food from home; allow for food preferences- Enhance eating environment  Outcome: Progressing

## 2025-05-11 NOTE — PROGRESS NOTES
INFECTIOUS DISEASE  PROGRESS NOTE            [facility]    Kylah Zarco Patient Status:  Inpatient    1945 MRN [id number]   [facility] 4NW-A Attending Keith Goldman MD   Hosp Day # 2 PCP Lalo Herrera MD     Antibiotics: Ceftriaxone #2  Metronidazole #1    Subjective:  : comfortable no pain    Objective:  Temp:  [98.2 °F (36.8 °C)-100.9 °F (38.3 °C)] 98.2 °F (36.8 °C)  Pulse:  [55-71] 55  Resp:  [15-20] 16  BP: (110-157)/(40-70) 127/55  SpO2:  [94 %-99 %] 97 %    General: awake alert  Vital signs:Temp:  [98.2 °F (36.8 °C)-100.9 °F (38.3 °C)] 98.2 °F (36.8 °C)  Pulse:  [55-71] 55  Resp:  [15-20] 16  BP: (110-157)/(40-70) 127/55  SpO2:  [94 %-99 %] 97 %  HEENT: Moist mucous membranes. Extraocular muscles are intact.  Neck: supple no masses  Respiratory: Non labored, symmetric excursion, normal respirations  Cardiovascular: no irregularities in rhythm  Abdomen: Soft, nontender, nondistended.   Musculoskeletal: joints: no swelling   Integument: No lesions. No erythema. No open wounds.  Labs:     COVID-19 Lab Results    COVID-19  Lab Results   Component Value Date    COVID19 Not Detected 2025    COVID19 Not Detected 2025    COVID19 Not Detected 2021       Pro-Calcitonin  No results for input(s): \"PCT\" in the last 168 hours.    Cardiac  No results for input(s): \"TROP\", \"PBNP\" in the last 168 hours.    Creatinine Kinase  No results for input(s): \"CK\" in the last 168 hours.    Inflammatory Markers  No results for input(s): \"CRP\", \"JULIUS\", \"LDH\", \"DDIMER\" in the last 168 hours.    Recent Labs   Lab 25  1343 05/10/25  0715   RBC 4.51 3.78*   HGB 13.5 11.4*   HCT 41.2 33.4*   MCV 91.4 88.4   MCH 29.9 30.2   MCHC 32.8 34.1   RDW 12.9 13.1   NEPRELIM 6.92  --    WBC 7.4 9.9   .0 171.0         Recent Labs   Lab 25  1343 05/10/25  0609   * 113*   BUN 11 9   CREATSERUM 0.82 0.67   CA 8.9 8.1*   ALB 4.6 3.7   * 137   K 3.7 3.7  3.7   CL  97* 107   CO2 28.0 23.0   ALKPHO 102 73   AST 60* 43*   ALT 56* 41   BILT 0.8 0.4   TP 7.3 5.9       No results found for: \"VANCT\"  Microbiology    Hospital Encounter on 05/09/25   1. Blood Culture     Status: Abnormal (Preliminary result)    Collection Time: 05/09/25  2:06 PM    Specimen: Blood,peripheral   Result Value Ref Range    Blood Culture Result Positive N/A    Blood Smear Positive Blood Culture (A) N/A    Blood Smear Gram Negative Rods (A) N/A         Problem list reviewed:  Problem List[1]          ASSESSMENT/PLAN:  1. Klebsiella and E coli bacteremia in association with diverticulitis  And possible infected liver cyst/abscess  -to small to place a catheter per IR    Continue Ceftriaxone and flagyl pending micro updates  DC planning when sensitivities final  with IV vs PO depending on above          Bartolo Macias MD, MD [facility]  [phone]         [1]   Patient Active Problem List  Diagnosis    Thyroid nodule    Microscopic hematuria    Influenza vaccination declined    Hyperglycemia    Fever, unspecified fever cause    Nausea vomiting and diarrhea    Hyponatremia    Lactic acidosis    Liver abscess (HCC)    Septicemia (HCC)      [Restricted in physically strenuous activity but ambulatory and able to carry out work of a light or sedentary nature] : Status 1- Restricted in physically strenuous activity but ambulatory and able to carry out work of a light or sedentary nature, e.g., light house work, office work [Normal] : grossly intact [de-identified] : Dry oral mucosa [de-identified] : VATS incision clean [de-identified] : extremely anxious

## 2025-05-11 NOTE — PROGRESS NOTES
[facility]   part of [facility]     Hospitalist Progress Note     Kylah Zarco Patient Status:  Inpatient    1945 MRN [id number]   Location [facility] 4NW-A Attending Keith Goldman MD   Hosp Day # 2 PCP Lalo Herrera MD     Subjective:   Had little abd pain last night    Objective:    Review of Systems:   A comprehensive review of systems was completed; pertinent positive and negatives stated in subjective.  Vital signs:  Temp:  [98.2 °F (36.8 °C)-100.9 °F (38.3 °C)] 98.2 °F (36.8 °C)  Pulse:  [55-71] 55  Resp:  [15-20] 16  BP: (110-157)/(40-70) 127/55  SpO2:  [94 %-99 %] 97 %  Physical Exam:    General: No acute distress , Ill appearing    Respiratory: no wheezes, no rhonchi  Cardiovascular: S1, S2, RRR  Abdomen: Soft, NT/ND, +BS  Extremities: no edema    Diagnostic Data:    Labs:  Recent Labs   Lab 25  1343 05/10/25  0715   WBC 7.4 9.9   HGB 13.5 11.4*   MCV 91.4 88.4   .0 171.0     Recent Labs   Lab 25  1343 05/10/25  0609   * 113*   BUN 11 9   CREATSERUM 0.82 0.67   CA 8.9 8.1*   ALB 4.6 3.7   * 137   K 3.7 3.7  3.7   CL 97* 107   CO2 28.0 23.0   ALKPHO 102 73   AST 60* 43*   ALT 56* 41   BILT 0.8 0.4   TP 7.3 5.9     Estimated Creatinine Clearance: 47.3 mL/min (based on SCr of 0.67 mg/dL).  No results for input(s): \"PTP\", \"INR\" in the last 168 hours.     Microbiology  Hospital Encounter on 25   1. Blood Culture     Status: Abnormal (Preliminary result)    Collection Time: 25  2:06 PM    Specimen: Blood,peripheral   Result Value Ref Range    Blood Culture Result Positive N/A    Blood Smear Positive Blood Culture (A) N/A    Blood Smear Gram Negative Rods (A) N/A     Imaging: Reviewed in Epic.  Medications: Scheduled Medications[1]    Assessment & Plan:    # Septicemia- E. Coli and Klebsiella in blood #Lactic acidosis  #elevated LFTs  #fevers- low grade overnight   - ID consult appreciated  - IR consulted- repeat CT fluid too small to  aspirate  -hepatic flexure possible colitis/diverticulitis with suggested asbcess as larger than previous image   - Abx per ID   - back to CLD and slowly advance as tolerated  - consider Sx consult pending clinical course            Supplementary Documentation:   Quality:  DVT Mechanical Prophylaxis:   SCDs, Early ambuation  DVT Pharmacologic Prophylaxis   Medication    [Held by provider] enoxaparin (Lovenox) 100 MG/ML SUBQ injection 40 mg                Code Status: Not on file  Walker: No urinary catheter in place  Walker Duration (in days):   Central line:    LUIS:   At this point Ms. Zarco is expected to be discharge to: home     The 21st Century Cures Act makes medical notes like these available to patients in the interest of transparency. Please be advised this is a medical document. Medical documents are intended to carry relevant information, facts as evident, and the clinical opinion of the practitioner. The medical note is intended as peer to peer communication and may appear blunt or direct. It is written in medical language and may contain abbreviations or verbiage that are unfamiliar.       **Certification      PHYSICIAN Certification of Need for Inpatient Hospitalization - Initial Certification    Patient will require inpatient services that will reasonably be expected to span two midnight's based on the clinical documentation in H+P.   Based on patients current state of illness, I anticipate that, after discharge, patient will require TBD.         [1]    cefTRIAXone  2 g Intravenous Q24H    metroNIDAZOLE  500 mg Intravenous Q12H    [Held by provider] enoxaparin  40 mg Subcutaneous Daily

## 2025-05-12 ENCOUNTER — ORDER TRANSCRIPTION (OUTPATIENT)
Dept: INFECTIOUS DISEASE | Facility: CLINIC | Age: 80
End: 2025-05-12

## 2025-05-12 VITALS
TEMPERATURE: 99 F | HEIGHT: 60 IN | WEIGHT: 130 LBS | DIASTOLIC BLOOD PRESSURE: 76 MMHG | SYSTOLIC BLOOD PRESSURE: 140 MMHG | OXYGEN SATURATION: 97 % | HEART RATE: 61 BPM | RESPIRATION RATE: 16 BRPM | BODY MASS INDEX: 25.52 KG/M2

## 2025-05-12 DIAGNOSIS — K57.92 DIVERTICULITIS: Primary | ICD-10-CM

## 2025-05-12 LAB
BACTERIA BLD CULT: POSITIVE
BACTERIA BLD CULT: POSITIVE
BLACTX-M ISLT/SPM QL: NOT DETECTED
E COLI DNA BLD POS QL NAA+NON-PROBE: DETECTED
K OXYTOCA DNA BLD POS QL NAA+NON-PROBE: DETECTED

## 2025-05-12 PROCEDURE — 99239 HOSP IP/OBS DSCHRG MGMT >30: CPT | Performed by: HOSPITALIST

## 2025-05-12 RX ORDER — LEVOFLOXACIN 500 MG/1
500 TABLET, FILM COATED ORAL DAILY
Qty: 14 TABLET | Refills: 0 | Status: SHIPPED | OUTPATIENT
Start: 2025-05-12 | End: 2025-05-26

## 2025-05-12 RX ORDER — METRONIDAZOLE 500 MG/1
500 TABLET ORAL 2 TIMES DAILY
Qty: 28 TABLET | Refills: 0 | Status: SHIPPED | OUTPATIENT
Start: 2025-05-12 | End: 2025-05-26

## 2025-05-12 NOTE — PLAN OF CARE
NURSING DISCHARGE NOTE    Discharged Home via Wheelchair.  Accompanied by Family member  Belongings Taken by patient/family.  Discharge teaching provided and verbalized understanding.      Problem: GASTROINTESTINAL - ADULT  Goal: Minimal or absence of nausea and vomiting  Description: INTERVENTIONS:- Maintain adequate hydration with IV or PO as ordered and tolerated- Nasogastric tube to low intermittent suction as ordered- Evaluate effectiveness of ordered antiemetic medications- Provide nonpharmacologic comfort measures as appropriate- Advance diet as tolerated, if ordered- Obtain nutritional consult as needed- Evaluate fluid balance  Outcome: Adequate for Discharge  Goal: Maintains or returns to baseline bowel function  Description: INTERVENTIONS:- Assess bowel function- Maintain adequate hydration with IV or PO as ordered and tolerated- Evaluate effectiveness of GI medications- Encourage mobilization and activity- Obtain nutritional consult as needed- Establish a toileting routine/schedule- Consider collaborating with pharmacy to review patient's medication profile  Outcome: Adequate for Discharge  Goal: Maintains adequate nutritional intake (undernourished)  Description: INTERVENTIONS:- Monitor percentage of each meal consumed- Identify factors contributing to decreased intake, treat as appropriate- Assist with meals as needed- Monitor I&O, WT and lab values- Obtain nutritional consult as needed- Optimize oral hygiene and moisture- Encourage food from home; allow for food preferences- Enhance eating environment  Outcome: Adequate for Discharge

## 2025-05-12 NOTE — DISCHARGE SUMMARY
[facility]     Kylah Zarco Patient Status:  Inpatient    1945 MRN [id number]   Location [facility] 4NW-A Attending No att. providers found   Hosp Day # 3 PCP Lalo Herrera MD     Date of Admission: 2025  Date of Discharge: 2025    Discharge Disposition: Home or Self Care    Admitting Diagnosis:   Lactic acidosis [E87.20]  Hyponatremia [E87.1]  Nausea vomiting and diarrhea [R11.2, R19.7]  Fever, unspecified fever cause [R50.9]    Hospital Discharge Diagnoses:  # Sepsis suspected with Septicemia- E. Coli and Klebsiella in blood - sensitive to multiple Abx with SIRS, endo organ with elevated LFTs and lactic acidosis  #Lactic acidosis  #Elevated LFTs  #fevers- improved   - ID consult appreciated  - IR consulted- repeat CT fluid too small to aspirate  -hepatic flexure possible colitis/diverticulitis with suggested asbcess as larger than previous image   - Abx per ID   - Tolerating diet     Lace+ Score: 44  59-90 High Risk  29-58 Medium Risk  0-28   Low Risk.     Brief Synopsis: Patient had positive blood cultures with 2 different bacteria.  Infectious disease placed on consultation.  CT of the abdomen showed liver lesion with suspected source of infection.  IR was placed on consultation but fluid collection too small to aspirate.  Patient continued on IV antibiotics and improved throughout the hospital course.  Infectious disease continue to follow.  Repeat CAT scan showed possible colitis or diverticulitis.  Patient had no pain or diarrhea.  Patient tolerating diet and converted to oral antibiotics per infectious disease recommendations for discharge.  Patient cleared by infectious disease and discharged in good condition.    Procedures during hospitalization:   None    Lab/Test results pending at Discharge:   None    Consultants:  Infectious disease, IR    Discharge Medication List:     Discharge Medications        START taking these medications         Instructions Prescription details   levoFLOXacin 500 MG Tabs  Commonly known as: Levaquin      Take 1 tablet (500 mg total) by mouth daily for 14 days.   Stop taking on: May 26, 2025  Quantity: 14 tablet  Refills: 0     metroNIDAZOLE 500 MG Tabs  Commonly known as: Flagyl      Take 1 tablet (500 mg total) by mouth 2 (two) times daily for 14 days.   Stop taking on: May 26, 2025  Quantity: 28 tablet  Refills: 0            CONTINUE taking these medications        Instructions Prescription details   diclofenac 1 % Gel  Commonly known as: Voltaren      Apply 4 g topically 4 (four) times daily.   Quantity: 20 g  Refills: 0     triamcinolone 0.1 % Crea  Commonly known as: Kenalog      Aplica un poquito de crema en el area afectada cada connor para 7 frank, y despues ahmet vez a la semana bertha se necesite   Quantity: 60 g  Refills: 0     vitamin E 400 UNITS Caps  Commonly known as: Alpha-E      Take 1,000 Units by mouth in the morning.   Refills: 0               Where to Get Your Medications        These medications were sent to [id number] - [place], [place] - [address] AT [facility] [address], [phone], [phone] [address] [address]      Phone: [phone]   levoFLOXacin 500 MG Tabs  metroNIDAZOLE 500 MG Tabs         Follow-up appointment:   Lalo Herrera MD  [address] [place] [address]  [facility] [address] [phone]    Follow up in 1 week(s)  Follow up    Bartolo Macias MD  [address] [place] [address] 3  [facility] [address] [phone]    Schedule an appointment as soon as possible for a visit on 5/28/2025        -----------------------------------------------------------------------------------------------  PATIENT DISCHARGE INSTRUCTIONS: See electronic chart    Keith Goldman MD 5/12/2025    Time spent: 33 minutes

## 2025-05-12 NOTE — PROGRESS NOTES
[facility]   part of [facility]     Hospitalist Progress Note     Kylah Zarco Patient Status:  Inpatient    1945 MRN [id number]   Location [facility] 4NW-A Attending Keith Goldman MD   Hosp Day # 3 PCP Lalo Herrera MD     Subjective:   Pt feeling fine     Objective:    Review of Systems:   A comprehensive review of systems was completed; pertinent positive and negatives stated in subjective.  Vital signs:  Temp:  [98 °F (36.7 °C)-99 °F (37.2 °C)] 99 °F (37.2 °C)  Pulse:  [52-61] 61  Resp:  [15-16] 16  BP: (128-141)/(61-76) 140/76  SpO2:  [97 %-100 %] 97 %  Physical Exam:    General: No acute distress     Respiratory: no wheezes, no rhonchi  Cardiovascular: S1, S2, RRR  Abdomen: Soft, NT/ND, +BS  Extremities: no edema    Diagnostic Data:    Labs:  Recent Labs   Lab 25  1343 05/10/25  0715   WBC 7.4 9.9   HGB 13.5 11.4*   MCV 91.4 88.4   .0 171.0     Recent Labs   Lab 25  1343 05/10/25  0609   * 113*   BUN 11 9   CREATSERUM 0.82 0.67   CA 8.9 8.1*   ALB 4.6 3.7   * 137   K 3.7 3.7  3.7   CL 97* 107   CO2 28.0 23.0   ALKPHO 102 73   AST 60* 43*   ALT 56* 41   BILT 0.8 0.4   TP 7.3 5.9     Estimated Creatinine Clearance: 47.3 mL/min (based on SCr of 0.67 mg/dL).  No results for input(s): \"PTP\", \"INR\" in the last 168 hours.     Microbiology  Hospital Encounter on 25   1. Blood Culture     Status: None (Preliminary result)    Collection Time: 05/10/25  7:22 AM    Specimen: Blood,peripheral   Result Value Ref Range    Blood Culture Result No Growth 1 Day N/A     Imaging: Reviewed in Epic.  Medications: Scheduled Medications[1]    Assessment & Plan:    # Septicemia- E. Coli and Klebsiella in blood - sensitive to multiple Abx   #Lactic acidosis  #Elevated LFTs  #fevers- improved   - ID consult appreciated  - IR consulted- repeat CT fluid too small to aspirate  -hepatic flexure possible colitis/diverticulitis with suggested asbcess as larger than  previous image   - Abx per ID   - Tolerating diet     DC planning hopefully today  Cx reviewed- hopeful to DC home on oral abx            Supplementary Documentation:   Quality:  DVT Mechanical Prophylaxis:   SCDs, Early ambuation  DVT Pharmacologic Prophylaxis   Medication    enoxaparin (Lovenox) 100 MG/ML SUBQ injection 40 mg                Code Status: Not on file  Walker: No urinary catheter in place  Walker Duration (in days):   Central line:    LUIS: 5/12/2025  At this point Ms. Zarco is expected to be discharge to: home     The 21st Century Cures Act makes medical notes like these available to patients in the interest of transparency. Please be advised this is a medical document. Medical documents are intended to carry relevant information, facts as evident, and the clinical opinion of the practitioner. The medical note is intended as peer to peer communication and may appear blunt or direct. It is written in medical language and may contain abbreviations or verbiage that are unfamiliar.       **Certification      PHYSICIAN Certification of Need for Inpatient Hospitalization - Initial Certification    Patient will require inpatient services that will reasonably be expected to span two midnight's based on the clinical documentation in H+P.   Based on patients current state of illness, I anticipate that, after discharge, patient will require TBD.         [1]    metroNIDAZOLE  500 mg Oral BID    cefTRIAXone  2 g Intravenous Q24H    enoxaparin  40 mg Subcutaneous Daily

## 2025-05-12 NOTE — PLAN OF CARE
Received pt a/o x4. VSS. Afebrile. Denies pain. Medication admin per MAR. IVF infusing. Denies nausea, fair po intake. Call light within reach. Safety precautions in place.     Problem: GASTROINTESTINAL - ADULT  Goal: Minimal or absence of nausea and vomiting  Description: INTERVENTIONS:- Maintain adequate hydration with IV or PO as ordered and tolerated- Nasogastric tube to low intermittent suction as ordered- Evaluate effectiveness of ordered antiemetic medications- Provide nonpharmacologic comfort measures as appropriate- Advance diet as tolerated, if ordered- Obtain nutritional consult as needed- Evaluate fluid balance  Outcome: Progressing     Problem: GASTROINTESTINAL - ADULT  Goal: Maintains adequate nutritional intake (undernourished)  Description: INTERVENTIONS:- Monitor percentage of each meal consumed- Identify factors contributing to decreased intake, treat as appropriate- Assist with meals as needed- Monitor I&O, WT and lab values- Obtain nutritional consult as needed- Optimize oral hygiene and moisture- Encourage food from home; allow for food preferences- Enhance eating environment  Outcome: Progressing

## 2025-05-12 NOTE — PROGRESS NOTES
INFECTIOUS DISEASE  PROGRESS NOTE            [facility]    Kylah Zarco Patient Status:  Inpatient    1945 MRN [id number]   [facility] 4NW-A Attending Keith Goldman MD   Hosp Day # 3 PCP Lalo Herrera MD     Antibiotics: Ceftriaxone #4  Metronidazole #3    Subjective:  : comfortable no pain    Objective:  Temp:  [98 °F (36.7 °C)-99 °F (37.2 °C)] 99 °F (37.2 °C)  Pulse:  [52-61] 61  Resp:  [15-16] 16  BP: (128-141)/(61-76) 140/76  SpO2:  [97 %-100 %] 97 %    General: awake alert  Vital signs:Temp:  [98 °F (36.7 °C)-99 °F (37.2 °C)] 99 °F (37.2 °C)  Pulse:  [52-61] 61  Resp:  [15-16] 16  BP: (128-141)/(61-76) 140/76  SpO2:  [97 %-100 %] 97 %  HEENT: Moist mucous membranes. Extraocular muscles are intact.  Neck: supple no masses  Respiratory: Non labored, symmetric excursion, normal respirations  Cardiovascular: no irregularities in rhythm  Abdomen: Soft, nontender, nondistended.   Musculoskeletal: joints: no swelling   Integument: No lesions. No erythema. No open wounds.  Labs:     COVID-19 Lab Results    COVID-19  Lab Results   Component Value Date    COVID19 Not Detected 2025    COVID19 Not Detected 2025    COVID19 Not Detected 2021       Pro-Calcitonin  No results for input(s): \"PCT\" in the last 168 hours.    Cardiac  No results for input(s): \"TROP\", \"PBNP\" in the last 168 hours.    Creatinine Kinase  No results for input(s): \"CK\" in the last 168 hours.    Inflammatory Markers  No results for input(s): \"CRP\", \"JULIUS\", \"LDH\", \"DDIMER\" in the last 168 hours.    Recent Labs   Lab 25  1343 05/10/25  0715   RBC 4.51 3.78*   HGB 13.5 11.4*   HCT 41.2 33.4*   MCV 91.4 88.4   MCH 29.9 30.2   MCHC 32.8 34.1   RDW 12.9 13.1   NEPRELIM 6.92  --    WBC 7.4 9.9   .0 171.0         Recent Labs   Lab 25  1343 05/10/25  0609   * 113*   BUN 11 9   CREATSERUM 0.82 0.67   CA 8.9 8.1*   ALB 4.6 3.7   * 137   K 3.7 3.7  3.7   CL 97* 107   CO2  28.0 23.0   ALKPHO 102 73   AST 60* 43*   ALT 56* 41   BILT 0.8 0.4   TP 7.3 5.9       No results found for: \"VANCT\"  Microbiology    Hospital Encounter on 05/09/25   1. Blood Culture     Status: None (Preliminary result)    Collection Time: 05/10/25  7:22 AM    Specimen: Blood,peripheral   Result Value Ref Range    Blood Culture Result No Growth 1 Day N/A         Problem list reviewed:  Problem List[1]          ASSESSMENT/PLAN:  1. Klebsiella and E coli bacteremia in association with diverticulitis  And possible infected liver cyst/abscess  -to small to place a catheter per IR    DC planning  PO levaquin and flagyl  Repeat CT 2 weeks          Bartolo Macias MD, MD  [facility]  [phone]         [1]   Patient Active Problem List  Diagnosis    Thyroid nodule    Microscopic hematuria    Influenza vaccination declined    Hyperglycemia    Fever, unspecified fever cause    Nausea vomiting and diarrhea    Hyponatremia    Lactic acidosis    Liver abscess (HCC)    Septicemia (HCC)

## 2025-05-13 ENCOUNTER — PATIENT OUTREACH (OUTPATIENT)
Dept: CASE MANAGEMENT | Age: 80
End: 2025-05-13

## 2025-05-13 NOTE — PROGRESS NOTES
Transitional Care Management   Discharge Date: 25  Contact Date: 2025    Assessment:  TCM Initial Assessment    General:  Assessment completed with: Children  Patient Subjective: Pt doing well.  Chief Complaint: Fever,  Verify patient name and  with patient/ caregiver: Yes    Hospital Stay/Discharge:  Prior to leaving the hospital were your Discharge Instructions reviewed with you?: Yes  Did you receive a copy of your written Discharge Instructions?: Yes  Do you feel better or worse since you left the hospital or emergency department?: Better    Follow - Up Appointment:  Do you have a follow-up appointment?: No  Are there any barriers to getting to your follow-up appointment?: No    Home Health/DME:  Prior to leaving the hospital was Home Health (HH) arranged for you?: No     Prior to leaving the hospital or emergency department was Durable Medical Equipment (DME), medical supplies, or infusions arranged for you?: No  Are DME/medical supply/infusions needs identified by staff during this assessment?: No     Medications/Diet:       Were you given a different diet per your Discharge Instructions?: No     Questions/Concerns:  Do you have any questions or concerns that have not been discussed?: No         Follow-up Appointments:  Your appointments       Date & Time Appointment Department (Center)    May 16, 2025 10:30 AM CDT Follow Up Visit with Sabiha Holman PA-C [facility], [address] [place] [address] ([facility] [address])              [address]   [address] [place] [address]  [phone]            Transitional Care Clinic  Was TCC Ordered: No    Primary Care Provider (If no TCC appointment)  Does patient already have a PCP appointment scheduled? No  Care Manager Scheduled PCP office TCM appointment with patient    Specialist  Does the patient have any other follow-up  appointment(s) that need to be scheduled? Yes   -If yes: Care Manager reviewed upcoming specialist appointments with patient: Yes   -Does the patient need assistance scheduling appointment(s): No      Book By Date: 5/26/25

## 2025-05-14 NOTE — PAYOR COMM NOTE
PLEASE GIVE RECONSIDERATION/APPROVAL TO THIS INPT ADMISSION       DISCHARGE REVIEW    Payor: [employer]  Subscriber #:  [id number]  Authorization Number: [id number]    Admit date: 25  Admit time:   6:39 PM  Discharge Date: 2025 12:01 PM     Admitting Physician: Diane Medina MD  Primary Care Physician: Lalo Herrera MD    Discharge Summary signed by Keith Goldman MD at 2025  3:29 PM       Author: Keith Goldman MD Specialty: HOSPITALIST, Internal Medicine Author Type: Physician    Filed: 2025  3:29 PM Date of Service: 2025 12:55 PM Status: Addendum     [facility]     Kylah Zarco Patient Status:  Inpatient    1945 MRN [id number]   Location [facility] 4NW-A Attending No att. providers found   Hosp Day # 3 PCP Lalo Herrera MD     Date of Admission: 2025  Date of Discharge: 2025    Discharge Disposition: Home or Self Care    Admitting Diagnosis:   Lactic acidosis [E87.20]  Hyponatremia [E87.1]  Nausea vomiting and diarrhea [R11.2, R19.7]  Fever, unspecified fever cause [R50.9]    Hospital Discharge Diagnoses:  # Sepsis suspected with Septicemia- E. Coli and Klebsiella in blood - sensitive to multiple Abx with SIRS, endo organ with elevated LFTs and lactic acidosis  #Lactic acidosis  #Elevated LFTs  #fevers- improved   - ID consult appreciated  - IR consulted- repeat CT fluid too small to aspirate  -hepatic flexure possible colitis/diverticulitis with suggested asbcess as larger than previous image   - Abx per ID   - Tolerating diet     Lace+ Score: 44  59-90 High Risk  29-58 Medium Risk  0-28   Low Risk.     Brief Synopsis: Patient had positive blood cultures with 2 different bacteria.  Infectious disease placed on consultation.  CT of the abdomen showed liver lesion with suspected source of infection.  IR was placed on consultation but fluid collection too small to aspirate.  Patient continued on IV antibiotics and  improved throughout the hospital course.  Infectious disease continue to follow.  Repeat CAT scan showed possible colitis or diverticulitis.  Patient had no pain or diarrhea.  Patient tolerating diet and converted to oral antibiotics per infectious disease recommendations for discharge.  Patient cleared by infectious disease and discharged in good condition.    Consultants:  Infectious disease, IR    Discharge Medication List:  START taking these medications        Instructions Prescription details   levoFLOXacin 500 MG Tabs  Commonly known as: Levaquin      Take 1 tablet (500 mg total) by mouth daily for 14 days.   Stop taking on: May 26, 2025  Quantity: 14 tablet  Refills: 0     metroNIDAZOLE 500 MG Tabs  Commonly known as: Flagyl      Take 1 tablet (500 mg total) by mouth 2 (two) times daily for 14 days.   Stop taking on: May 26, 2025  Quantity: 28 tablet  Refills: 0            CONTINUE taking these medications        Instructions Prescription details   diclofenac 1 % Gel  Commonly known as: Voltaren      Apply 4 g topically 4 (four) times daily.   Quantity: 20 g  Refills: 0     triamcinolone 0.1 % Crea  Commonly known as: Kenalog      Aplica un poquito de crema en el area afectada cada ocnnor para 7 frank, y despues ahmet vez a la semana bertha se necesite   Quantity: 60 g  Refills: 0     vitamin E 400 UNITS Caps  Commonly known as: Alpha-E      Take 1,000 Units by mouth in the morning.   Refills: 0       Follow-up appointment:   Lalo Herrera MD  [address] [place] [address]  [facility] [id number] [phone]    Follow up in 1 week(s)  Follow up    Bartolo Macias MD  [address] [place] [address] 3  [place] [address]  [phone]    Schedule an appointment as soon as possible for a visit on 5/28/2025    Keith Goldman MD 5/12/2025    REVIEWER COMMENTS      PLEASE GIVE RECONSIDERATION/APPROVAL TO THIS INPT ADMISSION

## 2025-05-14 NOTE — PAYOR COMM NOTE
PLEASE GIVE RECONSIDERATION/APPROVAL TO THIS INPT ADMISSION      ADMISSION REVIEW     Payor: [employer]  Subscriber #:  [id number]  Authorization Number: [id number]    Admit date: 5/9/25  Admit time:  6:39 PM       REVIEW DOCUMENTATION:  ED Provider Notes signed by Diana Gutierrez MD at 5/12/2025 11:59 AM       Author: Diana Gutierrez MD Service: Emergency Medicine Author Type: Physician    Filed: 5/12/2025 11:59 AM Date of Service: 5/9/2025  2:08 PM Status: Signed     Patient Seen in: [facility] [place]    History     Chief Complaint   Patient presents with    Dehydration     Stated Complaint: Elevated B/P and dehydration due to nausea/vomiting since Monday    HPI  79-year-old with a history of hypertension GERD and hyperlipidemia presents to the emergency department with nausea and vomiting since Monday concerned that she is dehydrated she arrives with a temperature of 104 degrees and a blood pressure of 192/88 she was sent over from her primary care doctor's office    Physical Exam     ED Triage Vitals   BP 05/09/25 1338 (!) 192/88   Pulse 05/09/25 1338 96   Resp 05/09/25 1338 18   Temp 05/09/25 1340 (!) 104 °F (40 °C)   Temp src 05/09/25 1340 Oral   SpO2 05/09/25 1338 98 %   O2 Device 05/09/25 1338 None (Room air)     Vital Signs  BP: 140/76  Pulse: 61  Resp: 16  Temp: 99 °F (37.2 °C)  Temp src: Oral  MAP (mmHg): 92    Oxygen Therapy  SpO2: 97 %  O2 Device: None (Room air)  Pulse Oximetry Type: Intermittent  Oximetry Probe Site Changed: No  Pulse Ox Probe Location: Left hand    Physical Exam  Vitals and nursing note reviewed.   Constitutional:       General: She is not in acute distress.     Appearance: She is well-developed. She is not diaphoretic.      Comments: Very tired appearing but speaking full sentences, she was easily aroused and not cachectic she does not appear toxic   HENT:      Head: Atraumatic.      Right Ear: External ear normal.      Left Ear: External ear  normal.      Nose: Nose normal.      Mouth/Throat:      Mouth: Mucous membranes are dry.   Eyes:      General: No scleral icterus.        Right eye: No discharge.         Left eye: No discharge.      Conjunctiva/sclera: Conjunctivae normal.      Pupils: Pupils are equal, round, and reactive to light.   Neck:      Vascular: No JVD.   Cardiovascular:      Rate and Rhythm: Regular rhythm. Tachycardia present.      Heart sounds: Normal heart sounds. No murmur heard.     No friction rub. No gallop.   Pulmonary:      Effort: Pulmonary effort is normal. No respiratory distress.      Breath sounds: Normal breath sounds. No stridor. No wheezing.   Chest:      Chest wall: No tenderness.   Abdominal:      General: Bowel sounds are normal. There is no distension.      Palpations: Abdomen is soft.      Tenderness: There is no abdominal tenderness. There is no guarding or rebound.   Musculoskeletal:         General: No tenderness or deformity. Normal range of motion.      Cervical back: Normal range of motion and neck supple.   Lymphadenopathy:      Cervical: No cervical adenopathy.   Skin:     General: Skin is warm and dry.      Coloration: Skin is not pale.      Findings: No erythema or rash.   Neurological:      Mental Status: She is alert and oriented to person, place, and time.      Cranial Nerves: No cranial nerve deficit.      Coordination: Coordination normal.   Psychiatric:         Behavior: Behavior normal.         Judgment: Judgment normal.     ED Course     Labs Reviewed   CBC WITH DIFFERENTIAL WITH PLATELET - Abnormal; Notable for the following components:       Result Value    Lymphocyte Absolute 0.33 (*)     Monocyte Absolute 0.07 (*)     All other components within normal limits   COMP METABOLIC PANEL (14) - Abnormal; Notable for the following components:    Glucose 148 (*)     Sodium 131 (*)     Chloride 97 (*)     Calculated Osmolality 274 (*)     AST 60 (*)     ALT 56 (*)     All other components within normal  limits   LACTIC ACID, PLASMA - Abnormal; Notable for the following components:    Lactic Acid 2.2 (*)     All other components within normal limits   URINALYSIS WITH CULTURE REFLEX - Abnormal; Notable for the following components:    Blood Urine Large (*)     Protein Urine Trace (*)     Urobilinogen Urine 1.0 (*)     All other components within normal limits   UA MICROSCOPIC ONLY, URINE - Abnormal; Notable for the following components:    RBC Urine >10 (*)     Bacteria Urine Rare (*)     Squamous Epi. Cells Few (*)     All other components within normal limits   CBC, PLATELET; NO DIFFERENTIAL - Abnormal; Notable for the following components:    RBC 3.78 (*)     HGB 11.4 (*)     HCT 33.4 (*)     All other components within normal limits   COMP METABOLIC PANEL (14) - Abnormal; Notable for the following components:    Glucose 113 (*)     Calcium, Total 8.1 (*)     AST 43 (*)     All other components within normal limits   BLOOD CULTURE - Abnormal; Notable for the following components:    Blood Culture Result Escherichia coli (*)     Blood Culture Result Klebsiella oxytoca (*)     Blood Smear Positive Blood Culture (*)     Blood Smear Gram Negative Rods (*)     All other components within normal limits   BLOOD CULTURE - Abnormal; Notable for the following components:    Blood Culture Result Escherichia coli (*)     Blood Culture Result Klebsiella oxytoca (*)     Blood Smear Positive Blood Culture (*)     Blood Smear Gram Negative Rods (*)     All other components within normal limits   BLD CULT ID BY PCR - Abnormal; Notable for the following components:    Escherichia coli by PCR Detected (*)     Klebsiella oxytoca by PCR Detected (*)     All other components within normal limits   LACTIC ACID REFLEX POST POSTIVE - Normal   POTASSIUM - Normal   POCT FLU TEST - Normal   RAPID SARS-COV-2 BY PCR - Normal   SARS-COV-2/FLU A AND B/RSV BY PCR (GENEXPERT) - Normal   RAINBOW DRAW LAVENDER   RAINBOW DRAW LIGHT GREEN   RAINBOW  DRAW BLUE   BLOOD CULTURE   BLOOD CULTURE     ED Course as of 05/12/25 1159  ------------------------------------------------------------  Time: 05/09 1558  Value: LACTIC ACID(!): 2.2  Comment: (Reviewed)  ------------------------------------------------------------  Time: 05/09 1558  Value: Bacteria Urine(!): Rare  Comment: (Reviewed)  ------------------------------------------------------------  Time: 05/09 1558  Value: WBC: 7.4  Comment: (Reviewed)  ------------------------------------------------------------  Time: 05/09 1558  Value: Sodium(!): 131  Comment: (Reviewed)     Discussion with the patient and son that this patient is 79 years old and has a fever of 104 degrees.  Given that information I worry that she could certainly have bacteremia.  While she has had nausea and vomiting she indicated that it was for 1 day son indicated that it was for the past 4.  Therefore we have to assume because nobody knew she was febrile that has been going on for an extended period of time.  Mildly elevated lactic acid and decreased sodium on labs, chest x-ray shows no signs of acute pneumonia.  Viral testing that we can actually do at this facility is negative.    MDM      Differential diagnosis includes but is not limited to urinary tract infection, bacteremia, COVID, flu, underlying significant infection, viral gastroenteritis, bacterial gastroenteritis    Patient afebrile and feeling much better after just fluids and Tylenol but I gave her a dose of ceftriaxone and I told the family that I was very uncomfortable with her going home because if you barely see anyone he is 79 years old actually have a temperature of 104 degrees and not be significantly ill    Admission disposition: 5/9/2025  3:59 PM    Medical Decision Making  Disposition and Plan     Clinical Impression:  1. Fever, unspecified fever cause    2. Nausea vomiting and diarrhea    3. Hyponatremia    4. Lactic acidosis      Disposition:  Admit  5/9/2025  3:59  pm    Sepsis Reassessment Note    BP (!) 192/88   Pulse 96   Temp (!) 104 °F (40 °C) (Oral)   Resp 18   Ht 152.4 cm (5')   Wt 73 kg   SpO2 98%   BMI 31.44 kg/m²      I completed the sepsis reassessment at 1629    Cardiac:  Regularity: Regular  Rate: Normal  Heart Sounds: S1,S2    Lungs:   Right: Clear  Left: Clear    Peripheral Pulses:  Radial: Right 1+ or Left 1+    Capillary Refill:  <3 Secs    Skin:  Temp/Moisture: Warm and Dry  Color: Normal    Diana Gutierrez MD  5/9/2025  2:08 PM    Diana Gutierrez MD on 5/12/2025 11:59 AM      History and Physical   Chief Complaint: Fever of 102 chills generalized weakness fatigue poor p.o. intake nausea vomiting     History of Present Illness:   79 year old female with history of hypertension hyperlipidemia presents to her primary care physician's office to be evaluated for fever chills generalized weakness fatigue.  Upon coming into emergency room where she was referred by her primary care physician temperature was 104 blood pressure 192/88 pulse ox 98% on room air at rest.  WBC is normal today her sodium is 131 AST 60 ALT 56 and lactic acid 2.2.  Chest x-ray no evidence of pneumonia and UA with hematuria.  Blood cultures sent from emergency room and will be followed.  Patient received empiric antibiotics ceftriaxone 2 g IV.    Lab 05/09/25  1343   RBC 4.51   HGB 13.5   HCT 41.2   MCV 91.4   MCH 29.9   MCHC 32.8   RDW 12.9   NEPRELIM 6.92   WBC 7.4   .0     *   BUN 11   CREATSERUM 0.82   EGFRCR 72   CA 8.9   ALB 4.6   *   K 3.7   CL 97*   CO2 28.0   ALKPHO 102   AST 60*   ALT 56*   BILT 0.8   TP 7.3       Assessment & Plan:  # 79 years old female presents with fever chills generalized weakness fatigue  - Patient denies cough shortness of breath abdominal pain dysuria frequency  - Previous imaging reviewed and patient has evidence of sigmoid diverticulosis without any abdominal pain  - Patient denies diarrhea  - Will continue to monitor clinically  overnight and reevaluate in the morning  - Patient may benefit from CT abdomen  - Empiric antibiotics started in the emergency room     # Hypertension  - Stable     # Dehydration  -continue ivfluids      5/10/2025  INFECTIOUS DISEASE CONSULTATION  Reason for Consultation:  Klebsiella and E coli bacteremia      History of Present Illness:  79 year old female reports was \"feeling lazy\", and developed epigastric abdominal pain and vomited. Fever at home of 102.  She went to Dr. Herrera office on 5/9 and sent to ED.      In ED temp of 104.0 and BP was elevated.   UA negative.  Blood cultures collected and received Ceftriaxone in ED.     Blood cultures collected in ED positive for GNR, E coli and Klebsiella by PCR, CTX-M not detected       Lab 05/09/25  1343 05/10/25  0715   RBC 4.51 3.78*   HGB 13.5 11.4*   HCT 41.2 33.4*   MCV 91.4 88.4   MCH 29.9 30.2   MCHC 32.8 34.1   RDW 12.9 13.1   NEPRELIM 6.92  --    WBC 7.4 9.9   .0 171.0     * 113*   BUN 11 9   CREATSERUM 0.82 0.67   CA 8.9 8.1*   ALB 4.6 3.7   * 137   K 3.7 3.7  3.7   CL 97* 107   CO2 28.0 23.0   ALKPHO 102 73   AST 60* 43*   ALT 56* 41   BILT 0.8 0.4   TP 7.3 5.9     1. Blood Culture     Status: Abnormal (Preliminary result)     Collection Time: 05/09/25  2:06 PM     Specimen: Blood,peripheral   Result Value Ref Range     Blood Culture Result Positive N/A     Blood Smear Positive Blood Culture (A) N/A     Blood Smear Gram Negative Rods (A) N/A            Escherichia coli by PCR Detected     Klebsiella oxytoca by PCR Detected          ASSESSMENT/PLAN:  1. E coli and Klebsiella bacteremia  -admitted with epigastric pain,vomiting,and fever that started day of admission  No abdominal pain or tenderness today  UA negative  Suspected intra-abdominal source     Continue Ceftriaxone, with the addition of metronidazole for anaerobic coverage  CT abd/pelvis with IV contrast        5/11/2025  Hospitalist Progress Note   Had little abd pain last night    -100.9 °F       Assessment & Plan:  # Septicemia- E. Coli and Klebsiella in blood #Lactic acidosis  #elevated LFTs  #fevers- low grade overnight   - ID consult appreciated  - IR consulted- repeat CT fluid too small to aspirate  -hepatic flexure possible colitis/diverticulitis with suggested asbcess as larger than previous image   - Abx per ID   - back to [facility]D and slowly advance as tolerated  - consider Sx consult pending clinical course     DVT Mechanical Prophylaxis:   SCDs, Early ambuation   [Held by provider] enoxaparin (Lovenox) 100 MG/ML SUBQ injection 40 mg       **Certification  PHYSICIAN Certification of Need for Inpatient Hospitalization - Initial Certification   Patient will require inpatient services that will reasonably be expected to span two midnight's based on the clinical documentation in H+P.   Based on patients current state of illness, I anticipate that, after discharge, patient will require TBD.    5/12/2025  INFECTIOUS DISEASE PROGRESS NOTE     Antibiotics: Ceftriaxone #4  Metronidazole #3     comfortable no pain       ASSESSMENT/PLAN:  1. Klebsiella and E coli bacteremia in association with diverticulitis  And possible infected liver cyst/abscess  -to small to place a catheter per IR     DC planning  PO levaquin and flagyl  Repeat CT 2 weeks      MEDICATIONS ADMINISTERED:  Medications 05/09/25 05/10/25 05/11/25 05/12/25   acetaminophen (Tylenol Extra Strength) tab 1,000 mg  Dose: 1,000 mg  Freq: Once Route: OR  Start: 05/09/25 1408 End: 05/09/25 1424    1424 CP-Given            ceFAZolin (Ancef) 2g in 10mL IV syringe premix  Dose: 2 g  Freq: Every 8 hours Route: IV  Last Dose: 2 g (05/12/25 1014)  Start: 05/12/25 1100 End: 05/12/25 1401   Order specific questions:          1014 MK-New Bag   1401-D/C'd      cefTRIAXone (Rocephin) 2 g in sodium chloride 0.9% 100 mL IVPB-ADDV  Dose: 2 g  Freq: Every 24 hours Route: IV  Last Dose: 2 g (05/11/25 1419)  Start: 05/10/25 1400 End: 05/12/25 0931    Admin Instructions:   Ceftriaxone must NOT be administered simultaneously with calcium containing IV solutions. Includes Y-site as well.  In patients other than neonates ceftriaxone and calcium containing products may administered sequentially, provided the line is flushed in between administrations.   Order specific questions:        1437 JS-New Bag       1419 JS-New Bag       0931-D/C'd       cefTRIAXone (Rocephin) 2 g in sodium chloride 0.9% 100 mL IVPB-ADDV  Dose: 2 g  Freq: Once Route: IV  Last Dose: Stopped (05/09/25 1608)  Start: 05/09/25 1521 End: 05/09/25 1608   Admin Instructions:   Ceftriaxone must NOT be administered simultaneously with calcium containing IV solutions. Includes Y-site as well.  In patients other than neonates ceftriaxone and calcium containing products may administered sequentially, provided the line is flushed in between administrations.   Order specific questions:       1538 LM-New Bag   1608 CP-Stopped           fentaNYL (Sublimaze) 50 mcg/mL injection  Start: 05/10/25 1319 End: 05/10/25 1346   Admin Instructions:   Merlin Ovalle: cabinet override     1330   1346-D/C'd        metroNIDAZOLE (Flagyl) tab 500 mg  Dose: 500 mg  Freq: 2 times daily Route: OR  Start: 05/11/25 0900 End: 05/12/25 1401   Order specific questions:         0840 JS-Given   2019 AM-Given      0913 MK-Given   1401-D/C'd      metroNIDAZOLE in sodium chloride 0.79% (Flagyl) 5 mg/mL IVPB premix 500 mg  Dose: 500 mg  Freq: Every 12 hours Route: IV  Last Dose: 500 mg (05/10/25 2250)  Start: 05/10/25 1100 End: 05/11/25 0801   Order specific questions:        1218 JS-New Bag   2250 SM-New Bag      0801-D/C'd        midazolam (Versed) 2 MG/2ML injection  Start: 05/10/25 1319 End: 05/10/25 1346   Admin Instructions:   Merlin Ovalle: cabinet override     1330   1346-D/C'd        piperacillin-tazobactam (Zosyn) 3.375 g in dextrose 5% 100 mL IVPB-ADDV  Dose: 3.375 g  Freq: Every 8 hours Route: IV  Last Dose: 3.375 g  (05/10/25 0851)  Start: 05/10/25 0830 End: 05/10/25 0929   Admin Instructions:   Incompatible with Lactated Ringers (LR)   Order specific questions:        0851 JS-New Bag   0929-D/C'd        potassium chloride (Klor-Con M20) tab 40 mEq  Dose: 40 mEq  Freq: Once Route: OR  Start: 05/09/25 1945 End: 05/09/25 2005   Admin Instructions:   Do not crush    2005 SM-Given            sodium chloride 0.9 % IV bolus 2,190 mL  Dose: 30 mL/kg  Weight Dosing Info: 73 kg  Freq: Once Route: IV  Last Dose: Stopped (05/09/25 1734)  Start: 05/09/25 1408 End: 05/09/25 1734   Admin Instructions:   Assess vital signs upon completion of IVF bolus. If SBP is <90 or MAP is <65 re-check in 10 minutes and notify physician of results.    1424 CP-New Bag   1734 CP-Stopped             sodium chloride 0.9% infusion  Rate: 10 mL/hr  Freq: Continuous Route: IV  Last Dose: Stopped (05/10/25 1335)  Start: 05/10/25 1330 End: 05/10/25 1419     1325 MG-New Bag   1335 MG-Stopped     1419-D/C'd         sodium chloride 0.9% infusion  Rate: 100 mL/hr  Freq: Continuous Route: IV  Start: 05/09/25 1815 End: 05/12/25 0942 2005 SM-New Bag       1915 JS-New Bag       1612 EM-New Bag       0419 AM-New Bag   0942-D/C'd      sodium chloride 0.9% infusion  Rate: 125 mL/hr  Freq: Continuous Route: IV  Last Dose: Stopped (05/09/25 1839)  Start: 05/09/25 1839 End: 05/09/25 2038   Admin Instructions:   ED holding order only  Cancel if other admission orders exist!    1839 SM-Stopped   2038-D/C'd           acetaminophen (Tylenol Extra Strength) tab 500 mg  Dose: 500 mg  Freq: Every 4 hours PRN Route: OR  PRN Reason: Fever  PRN Comment: equal to or greater than 100.4  Start: 05/09/25 1813 End: 05/12/25 1401   Admin Instructions:   do not initiate oral therapy until 6-8 hours after the last IV acetaminophen dose if IV acetaminophen was used previously     1919 JS-Given [C]        1401-D/C'd       fentaNYL (Sublimaze) 50 mcg/mL injection  Start: 05/10/25 1319 End:  05/10/25 1346   Admin Instructions:   Merlin Ovalle: cabinet override     1330   1346-D/C'd         ibuprofen (Motrin) tab 200 mg  Dose: 200 mg  Freq: Every 4 hours PRN Route: OR  PRN Reason: mild pain  Start: 05/09/25 1813 End: 05/12/25 1401   Admin Instructions:   Follow range order policy. Start with ibuprofen 200 for mild pain, ibuprofen 400 mg for moderate pain or ibuprofen 600 for severe pain.     0439 SM-Given          1401-D/C'd       Or   ibuprofen (Motrin) tab 400 mg  Dose: 400 mg  Freq: Every 4 hours PRN Route: OR  PRN Reason: moderate pain  Start: 05/09/25 1813 End: 05/12/25 1401   Admin Instructions:   Follow range order policy. Start with ibuprofen 200 for mild pain, ibuprofen 400 mg for moderate pain or ibuprofen 600 for severe pain.        2143 SM-Given       1401-D/C'd       iopamidol 76% (ISOVUE-370) injection for power injector  Dose: 100 mL  Freq: IMG once as needed Route: IV  PRN Reason: contrast  Start: 05/10/25 1035 End: 05/10/25 1035     1035 AR-Given           midazolam (Versed) 2 MG/2ML injection  Start: 05/10/25 1319 End: 05/10/25 1346   Admin Instructions:   Merlin Ovalle: cabinet override     3590   1346-D/C'd          Vitals      Date/Time Temp Pulse Resp BP SpO2 Weight O2 Device O2 Flow Rate (L/min) [facility]    05/12/25 0414 99 °F (37.2 °C) 61 16 140/76 97 % -- None (Room air) --     05/12/25 0007 98.1 °F (36.7 °C) -- -- -- -- -- -- --     05/11/25 2007 98.3 °F (36.8 °C) 53 15 141/61 100 % -- None (Room air) -- KL    05/11/25 1606 98.4 °F (36.9 °C) -- -- -- -- -- -- -- EM    05/11/25 1225 98 °F (36.7 °C) 52 16 128/62 100 % -- None (Room air) -- EM    05/11/25 0835 98.2 °F (36.8 °C) -- -- -- -- -- -- --     05/11/25 0400 98.2 °F (36.8 °C) 55 16 127/55 97 % -- None (Room air) --      05/10/25 1948 99.6 °F (37.6 °C) 71 16 157/66 98 % -- None (Room air) --    05/10/25 1914 100.9 °F (38.3 °C) Abnormal  -- -- -- -- -- -- --    05/10/25 1335 -- 69 15 141/70 99 % -- -- -- MG    05/10/25 13:30:35 -- -- -- -- -- -- Nasal cannula 2 L/min MG   05/10/25 1105 98.4 °F (36.9 °C) 60 20 110/40 94 % -- None (Room air) -- MF   05/10/25 0429 -- -- -- 161/52 Abnormal  -- -- -- -- SM   05/10/25 0300 99.2 °F (37.3 °C) 62 14 165/73 Abnormal  99 % -- None (Room air) -- KL   05/09/25 2018 98 °F (36.7 °C) 61 16 96/38 96 % -- None (Room air) -- KL   05/09/25 1908 -- -- -- -- -- 130 lb (59 kg) -- -- EMA   05/09/25 1850 98.4 °F (36.9 °C) 67 17 102/47 96 % -- None (Room air) -- LM   05/09/25 1734 98.7 °F (37.1 °C) 75 17 111/49 96 % -- None (Room air) -- CP   05/09/25 1629 98.8 °F (37.1 °C) 80 22 104/68 96 % -- None (Room air) -- CP   05/09/25 1543 100.5 °F (38.1 °C) Abnormal  82 22 122/52 96 % -- None (Room air) -- LMA   05/09/25 1425 103.3 °F (39.6 °C) Abnormal  99 26 155/66 94 % -- -- -- CP   05/09/25 1342 -- -- -- -- -- -- None (Room air) -- CP   05/09/25 1340 104 °F (40 °C) Abnormal  -- -- -- -- -- -- -- SC   05/09/25 1338 -- 96 18 192/88 Abnormal  98 % 161 lb (73 kg) None (Room air) -- SC       PLEASE GIVE RECONSIDERATION/APPROVAL TO THIS INPT ADMISSION

## 2025-05-16 ENCOUNTER — OFFICE VISIT (OUTPATIENT)
Dept: INTERNAL MEDICINE CLINIC | Facility: CLINIC | Age: 80
End: 2025-05-16
Payer: COMMERCIAL

## 2025-05-16 VITALS
SYSTOLIC BLOOD PRESSURE: 118 MMHG | RESPIRATION RATE: 16 BRPM | HEIGHT: 68 IN | HEART RATE: 88 BPM | WEIGHT: 126 LBS | BODY MASS INDEX: 19.1 KG/M2 | TEMPERATURE: 99 F | DIASTOLIC BLOOD PRESSURE: 72 MMHG | OXYGEN SATURATION: 98 %

## 2025-05-16 DIAGNOSIS — K57.92 ACUTE DIVERTICULITIS: ICD-10-CM

## 2025-05-16 DIAGNOSIS — R11.0 NAUSEA: ICD-10-CM

## 2025-05-16 DIAGNOSIS — A41.9 SEPTICEMIA (HCC): Primary | ICD-10-CM

## 2025-05-16 DIAGNOSIS — K75.0 LIVER ABSCESS (HCC): ICD-10-CM

## 2025-05-16 RX ORDER — ONDANSETRON 4 MG/1
4 TABLET, ORALLY DISINTEGRATING ORAL EVERY 8 HOURS PRN
Qty: 20 TABLET | Refills: 0 | Status: SHIPPED | OUTPATIENT
Start: 2025-05-16

## 2025-05-16 NOTE — PROGRESS NOTES
The following individual(s) verbally consented to be recorded using ambient AI listening technology and understand that they can each withdraw their consent to this listening technology at any point by asking the clinician to turn off or pause the recording:    Patient name: Kylah Judd Zarco  Additional names:  none

## 2025-05-17 PROBLEM — R19.7 NAUSEA VOMITING AND DIARRHEA: Status: RESOLVED | Noted: 2025-05-09 | Resolved: 2025-05-17

## 2025-05-17 PROBLEM — R73.9 HYPERGLYCEMIA: Status: RESOLVED | Noted: 2025-05-09 | Resolved: 2025-05-17

## 2025-05-17 PROBLEM — E87.1 HYPONATREMIA: Status: RESOLVED | Noted: 2025-05-09 | Resolved: 2025-05-17

## 2025-05-17 PROBLEM — R11.2 NAUSEA VOMITING AND DIARRHEA: Status: RESOLVED | Noted: 2025-05-09 | Resolved: 2025-05-17

## 2025-05-17 PROBLEM — R50.9 FEVER, UNSPECIFIED FEVER CAUSE: Status: RESOLVED | Noted: 2025-05-09 | Resolved: 2025-05-17

## 2025-05-17 PROBLEM — A41.9 SEPTICEMIA (HCC): Status: RESOLVED | Noted: 2025-05-10 | Resolved: 2025-05-17

## 2025-05-17 PROBLEM — E87.20 LACTIC ACIDOSIS: Status: RESOLVED | Noted: 2025-05-09 | Resolved: 2025-05-17

## 2025-05-18 NOTE — PROGRESS NOTES
Subjective:   Kylah Zarco is a 79 year old female who presents for hospital follow up.   She was discharged from [facility] to Home or Self Care  Admission Date: 5/9/25   Discharge Date: 5/12/25  Hospital Discharge Diagnosis:   Septicemia  Liver abscess  Diverticulitis     Interactive contact within 2 business days post discharge first initiated on Date: 5/13/2025    I accessed [employer] and/or dev9k Everywhere and personally reviewed the following for the recent hospitalization: provider notes, consults, summaries, labs and other test results and the pertinent findings are documented below.     HPI:   History of Present Illness  Ms. Kylah Zarco is an 79-year-old female who presents with nausea and gastrointestinal symptoms following a recent hospitalization for a liver abscess and diverticulitis. She is accompanied by her son.  Brief Synopsis: Patient had positive blood cultures with 2 different bacteria.  Infectious disease placed on consultation.  CT of the abdomen showed liver lesion with suspected source of infection.  IR was placed on consultation but fluid collection too small to aspirate.  Patient continued on IV antibiotics and improved throughout the hospital course.  Infectious disease continue to follow.  Repeat CAT scan showed possible colitis or diverticulitis.  Patient had no pain or diarrhea.  Patient tolerating diet and converted to oral antibiotics per infectious disease recommendations for discharge.  Patient cleared by infectious disease and discharged in good condition.       Since discharge: Currently, she experiences significant nausea, especially after taking antibiotics, without vomiting but with occasional lightheadedness and dizziness. She manages symptoms by eating small amounts and staying hydrated. Bowel movements are slightly loose, two to three times daily, without severe diarrhea. She feels tired and weak with decreased appetite, slight weight loss, and is  encouraged to eat more. She consumes cooked vegetables and considers adding protein shakes. No pain or fever since discharge.       History/Other:   Current Medications:  Medication Reconciliation:  I am aware of an inpatient discharge within the last 30 days.  The discharge medication list has been reconciled with the patient's current medication list and reviewed by me. See medication list for additions of new medication, and changes to current doses of medications and discontinued medications.  Active Meds, Sig Only[1]    Review of Systems:  GENERAL: weight stable, energy stable, no sweating  SKIN: denies any unusual skin lesions  EYES: denies blurred vision or double vision  HEENT: denies nasal congestion, sinus pain or ST  LUNGS: denies shortness of breath with exertion  CARDIOVASCULAR: denies chest pain on exertion or palpitations  GI: denies abdominal pain, denies heartburn, denies diarrhea  MUSCULOSKELETAL: denies pain, normal range of motion of extremities  NEURO: denies headaches, denies dizziness, denies weakness  PSYCHE: denies depression or anxiety  HEMATOLOGIC: denies hx of anemia, or bruising, denies bleeding  ENDOCRINE: denies thyroid history  ALL/ASTHMA: denies hx of allergy or asthma    Objective:   No LMP recorded. (Menstrual status: Partial Hysterectomy).  Estimated body mass index is 19.16 kg/m² as calculated from the following:    Height as of this encounter: 5' 8\" (1.727 m).    Weight as of this encounter: 126 lb (57.2 kg).   /72   Pulse 88   Temp 98.7 °F (37.1 °C)   Resp 16   Ht 5' 8\" (1.727 m)   Wt 126 lb (57.2 kg)   SpO2 98%   BMI 19.16 kg/m²    GENERAL: well developed, well nourished, in no apparent distress  SKIN: no rashes, no suspicious lesions  HEENT: atraumatic, normocephalic, ears and throat are clear  EYES: PERRLA, EOMI, conjunctiva are clear  NECK: supple, no adenopathy, no bruits  CHEST: no chest tenderness  LUNGS: clear to auscultation  CARDIO: RRR without  murmur  GI: good BS's, no masses, HSM or tenderness  MUSCULOSKELETAL: back is not tender, FROM of the extremities  EXTREMITIES: no cyanosis, clubbing or edema  NEURO: Oriented times three, cranial nerves are intact, motor and sensory are grossly intact    Assessment & Plan:   1. Septicemia (HCC) (Primary)  Resolved. Finish abx and f/up with ID as planned  2. Liver abscess (HCC)  Has f/up CT planned, reminded them to schedule  3. Acute diverticulitis  Symptoms resolved. Finish abx.  4. Nausea  -     Ondansetron; Take 1 tablet (4 mg total) by mouth every 8 (eight) hours as needed for Nausea.  Dispense: 20 tablet; Refill: 0        Return if symptoms worsen or fail to improve.          [1]   Outpatient Medications Marked as Taking for the 5/16/25 encounter (Office Visit) with Sabiha Holman PA-C   Medication Sig    ondansetron 4 MG Oral Tablet Dispersible Take 1 tablet (4 mg total) by mouth every 8 (eight) hours as needed for Nausea.    metroNIDAZOLE 500 MG Oral Tab Take 1 tablet (500 mg total) by mouth 2 (two) times daily for 14 days.    levoFLOXacin 500 MG Oral Tab Take 1 tablet (500 mg total) by mouth daily for 14 days.    triamcinolone 0.1 % External Cream Aplica un poquito de crema en el area afectada cada connor para 7 frank, y despues ahmet vez a la semana bertha se necesite    diclofenac (VOLTAREN) 1 % External Gel Apply 4 g topically 4 (four) times daily.    vitamin E 400 UNITS Oral Cap Take 1,000 Units by mouth in the morning.

## 2025-05-29 ENCOUNTER — HOSPITAL ENCOUNTER (OUTPATIENT)
Dept: CT IMAGING | Age: 80
Discharge: HOME | End: 2025-05-29
Attending: INTERNAL MEDICINE
Payer: MEDICARE

## 2025-05-29 DIAGNOSIS — K57.92 DIVERTICULITIS: ICD-10-CM

## 2025-05-29 PROCEDURE — 74177 CT ABD & PELVIS W/CONTRAST: CPT | Performed by: INTERNAL MEDICINE

## 2025-05-29 RX ORDER — IOHEXOL 350 MG/ML
80 INJECTION, SOLUTION INTRAVENOUS
Status: COMPLETED | OUTPATIENT
Start: 2025-05-29 | End: 2025-05-29

## 2025-05-29 RX ADMIN — IOHEXOL 80 ML: 350 INJECTION, SOLUTION INTRAVENOUS at 09:10:00

## 2025-07-06 ENCOUNTER — PATIENT MESSAGE (OUTPATIENT)
Dept: INTERNAL MEDICINE CLINIC | Facility: CLINIC | Age: 80
End: 2025-07-06

## 2025-07-06 DIAGNOSIS — N95.2 ATROPHIC VAGINITIS: ICD-10-CM

## 2025-07-08 ENCOUNTER — LAB ENCOUNTER (OUTPATIENT)
Dept: LAB | Age: 80
End: 2025-07-08
Payer: MEDICARE

## 2025-07-08 DIAGNOSIS — R71.0 DECREASED HEMOGLOBIN: ICD-10-CM

## 2025-07-08 LAB
BASOPHILS # BLD AUTO: 0.05 X10(3) UL (ref 0–0.2)
BASOPHILS NFR BLD AUTO: 0.8 %
EOSINOPHIL # BLD AUTO: 0.18 X10(3) UL (ref 0–0.7)
EOSINOPHIL NFR BLD AUTO: 2.9 %
ERYTHROCYTE [DISTWIDTH] IN BLOOD BY AUTOMATED COUNT: 14.4 %
HCT VFR BLD AUTO: 42.5 % (ref 35–48)
HGB BLD-MCNC: 13.4 G/DL (ref 12–16)
IMM GRANULOCYTES # BLD AUTO: 0.01 X10(3) UL (ref 0–1)
IMM GRANULOCYTES NFR BLD: 0.2 %
LYMPHOCYTES # BLD AUTO: 1.79 X10(3) UL (ref 1–4)
LYMPHOCYTES NFR BLD AUTO: 28.9 %
MCH RBC QN AUTO: 29.5 PG (ref 26–34)
MCHC RBC AUTO-ENTMCNC: 31.5 G/DL (ref 31–37)
MCV RBC AUTO: 93.6 FL (ref 80–100)
MONOCYTES # BLD AUTO: 0.54 X10(3) UL (ref 0.1–1)
MONOCYTES NFR BLD AUTO: 8.7 %
NEUTROPHILS # BLD AUTO: 3.62 X10 (3) UL (ref 1.5–7.7)
NEUTROPHILS # BLD AUTO: 3.62 X10(3) UL (ref 1.5–7.7)
NEUTROPHILS NFR BLD AUTO: 58.5 %
PLATELET # BLD AUTO: 254 10(3)UL (ref 150–450)
RBC # BLD AUTO: 4.54 X10(6)UL (ref 3.8–5.3)
WBC # BLD AUTO: 6.2 X10(3) UL (ref 4–11)

## 2025-07-08 PROCEDURE — 85025 COMPLETE CBC W/AUTO DIFF WBC: CPT

## 2025-07-08 PROCEDURE — 36415 COLL VENOUS BLD VENIPUNCTURE: CPT

## 2025-07-09 RX ORDER — TRIAMCINOLONE ACETONIDE 1 MG/G
CREAM TOPICAL
Qty: 60 G | Refills: 0 | Status: SHIPPED | OUTPATIENT
Start: 2025-07-09

## 2025-07-11 ENCOUNTER — HOSPITAL ENCOUNTER (OUTPATIENT)
Dept: ULTRASOUND IMAGING | Age: 80
Discharge: HOME | End: 2025-07-11
Payer: MEDICARE

## 2025-07-11 DIAGNOSIS — E04.1 THYROID NODULE: ICD-10-CM

## 2025-07-11 PROCEDURE — 76536 US EXAM OF HEAD AND NECK: CPT

## (undated) DEVICE — FILTERLINE NASAL ADULT O2/CO2

## (undated) DEVICE — 1200CC GUARDIAN II: Brand: GUARDIAN

## (undated) DEVICE — Device: Brand: DEFENDO AIR/WATER/SUCTION AND BIOPSY VALVE

## (undated) DEVICE — ENDOSCOPY PACK - LOWER: Brand: MEDLINE INDUSTRIES, INC.

## (undated) DEVICE — 3M™ RED DOT™ MONITORING ELECTRODE WITH FOAM TAPE AND STICKY GEL, 50/BAG, 20/CASE, 72/PLT 2570: Brand: RED DOT™

## (undated) NOTE — ED AVS SNAPSHOT
THE [facility] Emergency Department in [facility]    Phone:  [phone]    Fax:  [phone]           Ms. Dimas Weldon Haroldo   MRN: [id number]    Department:  THE [facility] Emergency Department in [facility] Expect to receive an electronic request (by e-mail or text) to complete a self-assessment the day after your visit. You may also receive a call from our patient liason soon after your visit.  Also, some patients receive a detailed feedback survey mailed to [address] [phone] [address] [facility] [id number] [address] ([address]) [address] [place] [address] Cardiac silhouette and pulmonary vasculature are unremarkable. No consolidation, pleural effusion or pneumothorax. IMPRESSION:  Unremarkable portable chest radiograph.         Dictated by: Jonathon Sidhu MD on 3/06/2017 at 12:00       Approved by: Rhina Gallegos

## (undated) NOTE — LETTER
Patient Name: Donta Iyer        : 1945       Medical Record #: [id number]    CONSENT FOR PROCEDURES/SEDATION    Date: 2021       Time: 10:04 AM        1.  I authorize the performance upon Donta Iyer the following:  Colonoscopy

## (undated) NOTE — MR AVS SNAPSHOT
[address] [facility] [address]  [facility],  [address]  [facility] [phone]               Thank you for choosing us for your health care visit with Sylvie Patrick MD.  We are glad to serve you and happy to provide you with this arora [address], [address] [facility]     Phone:  [phone] - amoxicillin 875 MG Tabs            [employer]EL[employer]     Call the [employer] for assistance with your inactive [employer]EL[employer] account    If you have quest

## (undated) NOTE — MR AVS SNAPSHOT
[address] [facility] 70  [facility],  [address]  [address] [place] [address] [phone]               Thank you for choosing us for your health care visit with Adrianna Chadwick MD.  We are glad to serve you and happy to provide you with this arora amoxicillin 875 MG Tabs   Take 1 tablet (875 mg total) by mouth 2 (two) times daily. Commonly known as:  AMOXIL           carvedilol 3.125 MG Tabs   3.125 mg.    Commonly known as:  COREG           docusate sodium 100 MG Caps   Take 1 capsule (100 mg tot

## (undated) NOTE — MR AVS SNAPSHOT
[address] [facility] [address]  [facility],  [address]  [address] [place] [address] [phone]               Thank you for choosing us for your health care visit with Inderjit Castano PA-C.   We are glad to serve you and happy to provide you with [facility] Commonly known as:  PRAVACHOL           vitamin E 400 UNITS Caps   Take 1,000 Units by mouth.                    Moku     Call the [employer] for assistance with your inactive Moku account    If you have questions, you can call [phone] to talk to Eat plenty of low-fat dairy products High fat meats and dairy   Choose whole grain products Foods high in sodium   Water is best for hydration Fast food.    Eat at home when possible     Tips for increasing your physical activity – Adults who are physically

## (undated) NOTE — ED AVS SNAPSHOT
[facility] in [address] [place] [address]    Phone:  [phone]    Fax:  [phone]           Ms. Subha Briceño Haroldo   MRN: [id number]    Department:  [facility] in [facility] of that Physician. IF THERE IS ANY CHANGE OR WORSENING OF YOUR CONDITION, CALL YOUR PRIMARY CARE PHYSICIAN AT ONCE OR RETURN IMMEDIATELY TO THE EMERGENCY DEPARTMENT.     If you have been prescribed any medication(s), please fill your prescription right a

## (undated) NOTE — LETTER
[address] [facility], [address] [facility], [address] [address] [address]  [facility] 2[address] [facility] 121          12/01/17     Brendan Hendersonpin  12/1/1945     [address]       Dear Ca Layne

## (undated) NOTE — ED AVS SNAPSHOT
[facility] in [address] [place] [address]    Phone:  [phone]    Fax:  [phone]           Ms. Sivakumar Zarco   MRN: [id number]    Department:  [facility] in [facility] Take 1 tablet (500 mg total) by mouth 2 (two) times daily as needed. TraMADol HCl 50 MG Tabs   Quantity:  20 tablet   Commonly known as:  ULTRAM   Take 1-2 tablets ( mg total) by mouth every 4 (four) hours as needed for Pain.             Where t tell this physician (or your personal doctor if your instructions are to return to your personal doctor) about any new or lasting problems. The primary care or specialist physician will see patients referred from the [facility] Emergency Department.  Mitchel Wang - If you are a smoker or have smoked in the last 12 months, we encourage you to explore options for quitting.     - If you have concerns related to behavioral health issues or thoughts of harming yourself, contact [facility] a There is a calcification measuring 3 x 3 mm in the retroperitoneum anteromedial to the right psoas muscle which could be a stone in the right ureter, but also could be a phlebolith in the right ovarian vein.  There is no hydronephrosis or hydroureter   e

## (undated) NOTE — MR AVS SNAPSHOT
[facility] [place]  [place],  [address]  [address] [place] [address] [phone]               Thank you for choosing us for your health care visit with Sunny Funes DO.   We are glad to serve you and happy to provide you with this sum Commonly known as:  LOVAZA           Pravastatin Sodium 40 MG Tabs   Take 1 tablet (40 mg total) by mouth nightly. Commonly known as:  PRAVACHOL           Sertraline HCl 25 MG Tabs   Take 1 tablet (25 mg total) by mouth daily.    Commonly known as:  Kamla Wu

## (undated) NOTE — LETTER
[address]  [address] [facility] [address], [address] [facility]  Authorization for Surgical Operation and Procedure     Date:___________                                                                                                         Time:__________ 4.   Should the need arise during my operation or immediate post-operative period, I also consent to the administration of blood and/or blood products.   Further, I understand that despite careful testing and screening of blood or blood products by ryder 8.   I recognize that in the event my procedure results in extended X-Ray/fluoroscopy time, I may develop a skin reaction. 9.  If I have a Do Not Attempt Resuscitation (DNAR) order in place, that status will be suspended while in the operating room, proc 1. ILaura agree to be cared for by an anesthesiologist, who is specially trained to monitor me and give me medicine to put me to sleep or keep me comfortable during my procedure    I understand that my anesthesiologist is not an employee or 5. My doctor has explained to me other choices available to me for my care (alternatives).   6. Pregnant Patients (“epidural”):  I understand that the risks of having an epidural (medicine given into my back to help control pain during labor), include itchi

## (undated) NOTE — MR AVS SNAPSHOT
After Visit Summary   3/20/2017    Wisam Wills    MRN: [id number]           Visit Information        Provider Department Dept Phone    3/20/2017  8:30 AM Lalo Arambula MD [facility] [address] [place] [address] [phone]      Your Vitals Were     BP Pulse Dietary sodium reduction Reduce dietary sodium intake to <= 100 mmol per day (2.4 g sodium or 6 g sodium chloride)   Aerobic physical activity Regular aerobic physical activity (e.g., brisk walking, light jogging, cycling, swimming, etc.) for a goal of at

## (undated) NOTE — MR AVS SNAPSHOT
[address] [facility] 70  [facility],  [address]  [address] [place] [address] [phone]               Thank you for choosing us for your health care visit with Dayana Sarah MD.  We are glad to serve you and happy to provide you with this arora Take 1 tablet (25 mg total) by mouth daily. Commonly known as:  ZOLOFT           vitamin E 400 UNITS Caps   Take 1,000 Units by mouth.                    MyChart               Educational Information     Your blood pressure indicates you may be at-risk fo

## (undated) NOTE — MR AVS SNAPSHOT
[facility] [place]  [place],  [address]  [address] [place] [id number] [phone]               Thank you for choosing us for your health care visit with Andrew Tyson PA-C.   We are glad to serve you and happy to provide you with this Commonly known as:  PRAVACHOL           Sertraline HCl 25 MG Tabs   Take 1 tablet (25 mg total) by mouth daily.    Commonly known as:  ZOLOFT           TraMADol HCl 50 MG Tabs   Take 1-2 tablets ( mg total) by mouth every 4 (four) hours as needed for

## (undated) NOTE — MR AVS SNAPSHOT
800 [facility] 70  [facility],  [address]  [address] [place] [address] [phone]               Thank you for choosing us for your health care visit with Gertrudis Ramírez MD.  We are glad to serve you and happy to provide you with this arora URINE CULTURE, ROUTINE [395] [Q]    Complete by:  As directed    Assoc Dx:  Blood in urine [R31.9]                 Results of Recent Testing     URINALYSIS, AUTO, W/O SCOPE      Component Value Standard Range & Units    GLUCOSE (URINE DIPSTICK) negative N

## (undated) NOTE — ED AVS SNAPSHOT
THE [facility] Emergency Department in [address] [facility]    Phone:  [phone]    Fax:  [phone]           Ms. Kojo Zarco   MRN: [id number]    Department:  THE [facility] Emergency Department in [facility] of that Physician. IF THERE IS ANY CHANGE OR WORSENING OF YOUR CONDITION, CALL YOUR PRIMARY CARE PHYSICIAN AT ONCE OR RETURN IMMEDIATELY TO THE EMERGENCY DEPARTMENT.     If you have been prescribed any medication(s), please fill your prescription right a